# Patient Record
Sex: MALE | Race: WHITE | NOT HISPANIC OR LATINO | Employment: FULL TIME | URBAN - METROPOLITAN AREA
[De-identification: names, ages, dates, MRNs, and addresses within clinical notes are randomized per-mention and may not be internally consistent; named-entity substitution may affect disease eponyms.]

---

## 2020-11-03 ENCOUNTER — HOSPITAL ENCOUNTER (EMERGENCY)
Facility: HOSPITAL | Age: 25
Discharge: HOME | End: 2020-11-04
Attending: EMERGENCY MEDICINE | Admitting: EMERGENCY MEDICINE

## 2020-11-03 DIAGNOSIS — H10.32 ACUTE CONJUNCTIVITIS OF LEFT EYE, UNSPECIFIED ACUTE CONJUNCTIVITIS TYPE: ICD-10-CM

## 2020-11-03 DIAGNOSIS — F10.920 ALCOHOLIC INTOXICATION WITHOUT COMPLICATION (HCC): Primary | ICD-10-CM

## 2020-11-03 PROCEDURE — 99283 EMERGENCY DEPT VISIT LOW MDM: CPT

## 2020-11-04 VITALS
RESPIRATION RATE: 18 BRPM | OXYGEN SATURATION: 100 % | WEIGHT: 198.63 LBS | DIASTOLIC BLOOD PRESSURE: 83 MMHG | HEIGHT: 73 IN | SYSTOLIC BLOOD PRESSURE: 141 MMHG | HEART RATE: 111 BPM | TEMPERATURE: 98.1 F | BODY MASS INDEX: 26.33 KG/M2

## 2020-11-04 LAB
ALBUMIN SERPL-MCNC: 5.5 G/DL (ref 3.5–5.2)
ALBUMIN/GLOB SERPL: 2.3 G/DL
ALP SERPL-CCNC: 93 U/L (ref 39–117)
ALT SERPL W P-5'-P-CCNC: 34 U/L (ref 1–41)
ANION GAP SERPL CALCULATED.3IONS-SCNC: 15 MMOL/L (ref 5–15)
APTT PPP: 26.4 SECONDS (ref 24–31)
AST SERPL-CCNC: 51 U/L (ref 1–40)
BASOPHILS # BLD AUTO: 0.1 10*3/MM3 (ref 0–0.2)
BASOPHILS NFR BLD AUTO: 0.9 % (ref 0–1.5)
BILIRUB SERPL-MCNC: 0.5 MG/DL (ref 0–1.2)
BUN SERPL-MCNC: 6 MG/DL (ref 6–20)
BUN/CREAT SERPL: 7.3 (ref 7–25)
CALCIUM SPEC-SCNC: 10 MG/DL (ref 8.6–10.5)
CHLORIDE SERPL-SCNC: 98 MMOL/L (ref 98–107)
CO2 SERPL-SCNC: 29 MMOL/L (ref 22–29)
CREAT SERPL-MCNC: 0.82 MG/DL (ref 0.76–1.27)
DEPRECATED RDW RBC AUTO: 39.8 FL (ref 37–54)
EOSINOPHIL # BLD AUTO: 0 10*3/MM3 (ref 0–0.4)
EOSINOPHIL NFR BLD AUTO: 0.5 % (ref 0.3–6.2)
ERYTHROCYTE [DISTWIDTH] IN BLOOD BY AUTOMATED COUNT: 12.1 % (ref 12.3–15.4)
ETHANOL UR QL: 0.34 %
GFR SERPL CREATININE-BSD FRML MDRD: 113 ML/MIN/1.73
GFR SERPL CREATININE-BSD FRML MDRD: 137 ML/MIN/1.73
GLOBULIN UR ELPH-MCNC: 2.4 GM/DL
GLUCOSE SERPL-MCNC: 117 MG/DL (ref 65–99)
HCT VFR BLD AUTO: 48.3 % (ref 37.5–51)
HGB BLD-MCNC: 17.1 G/DL (ref 13–17.7)
HOLD SPECIMEN: NORMAL
HOLD SPECIMEN: NORMAL
INR PPP: <0.93 (ref 0.93–1.1)
LYMPHOCYTES # BLD AUTO: 3 10*3/MM3 (ref 0.7–3.1)
LYMPHOCYTES NFR BLD AUTO: 32.2 % (ref 19.6–45.3)
MCH RBC QN AUTO: 33 PG (ref 26.6–33)
MCHC RBC AUTO-ENTMCNC: 35.4 G/DL (ref 31.5–35.7)
MCV RBC AUTO: 93.4 FL (ref 79–97)
MONOCYTES # BLD AUTO: 1.1 10*3/MM3 (ref 0.1–0.9)
MONOCYTES NFR BLD AUTO: 11.9 % (ref 5–12)
NEUTROPHILS NFR BLD AUTO: 5.1 10*3/MM3 (ref 1.7–7)
NEUTROPHILS NFR BLD AUTO: 54.5 % (ref 42.7–76)
NRBC BLD AUTO-RTO: 0.1 /100 WBC (ref 0–0.2)
PLATELET # BLD AUTO: 224 10*3/MM3 (ref 140–450)
PMV BLD AUTO: 8.8 FL (ref 6–12)
POTASSIUM SERPL-SCNC: 3.9 MMOL/L (ref 3.5–5.2)
PROT SERPL-MCNC: 7.9 G/DL (ref 6–8.5)
PROTHROMBIN TIME: 9.8 SECONDS (ref 9.6–11.7)
RBC # BLD AUTO: 5.17 10*6/MM3 (ref 4.14–5.8)
SODIUM SERPL-SCNC: 142 MMOL/L (ref 136–145)
WBC # BLD AUTO: 9.4 10*3/MM3 (ref 3.4–10.8)
WHOLE BLOOD HOLD SPECIMEN: NORMAL
WHOLE BLOOD HOLD SPECIMEN: NORMAL

## 2020-11-04 PROCEDURE — 25010000002 THIAMINE PER 100 MG: Performed by: EMERGENCY MEDICINE

## 2020-11-04 PROCEDURE — 85025 COMPLETE CBC W/AUTO DIFF WBC: CPT | Performed by: EMERGENCY MEDICINE

## 2020-11-04 PROCEDURE — 80053 COMPREHEN METABOLIC PANEL: CPT | Performed by: EMERGENCY MEDICINE

## 2020-11-04 PROCEDURE — 96365 THER/PROPH/DIAG IV INF INIT: CPT

## 2020-11-04 PROCEDURE — 80307 DRUG TEST PRSMV CHEM ANLYZR: CPT | Performed by: EMERGENCY MEDICINE

## 2020-11-04 PROCEDURE — 85730 THROMBOPLASTIN TIME PARTIAL: CPT | Performed by: EMERGENCY MEDICINE

## 2020-11-04 PROCEDURE — 85610 PROTHROMBIN TIME: CPT | Performed by: EMERGENCY MEDICINE

## 2020-11-04 RX ORDER — SODIUM CHLORIDE 0.9 % (FLUSH) 0.9 %
10 SYRINGE (ML) INJECTION AS NEEDED
Status: DISCONTINUED | OUTPATIENT
Start: 2020-11-04 | End: 2020-11-04 | Stop reason: HOSPADM

## 2020-11-04 RX ORDER — SULFACETAMIDE SODIUM 100 MG/ML
1 SOLUTION/ DROPS OPHTHALMIC EVERY 4 HOURS
Qty: 10 ML | Refills: 0 | OUTPATIENT
Start: 2020-11-04 | End: 2021-06-27

## 2020-11-04 RX ADMIN — THIAMINE HYDROCHLORIDE 1000 ML/HR: 100 INJECTION, SOLUTION INTRAMUSCULAR; INTRAVENOUS at 00:39

## 2020-11-04 NOTE — ED PROVIDER NOTES
"Subjective   History of Present Illness  Left eye redness  25-year-old male states he has redness of left eye over last 2 days.  Is a he also had a nosebleed.  He denies any congestion or eye pain or blurry vision.  He states he did have a seizure 3 days ago and has not drinking alcohol in 3 days with a long history of alcohol abuse.  He states he has had alcohol withdrawal seizures in the past.  He reports no headache or focal numbness or weakness or trauma.  Review of Systems   Constitutional: Negative.    HENT: Positive for nosebleeds.    Eyes: Positive for redness. Negative for visual disturbance.   Respiratory: Negative.    Cardiovascular: Negative.    Gastrointestinal: Negative.    Genitourinary: Negative.    Musculoskeletal: Negative.    Skin: Negative.    Neurological: Negative.    Psychiatric/Behavioral: Negative.        No past medical history on file.    No Known Allergies    No past surgical history on file.    No family history on file.    Social History     Socioeconomic History   • Marital status: Single     Spouse name: Not on file   • Number of children: Not on file   • Years of education: Not on file   • Highest education level: Not on file       Prior to Admission medications    Not on File     /93   Pulse (!) 121   Temp 98.1 °F (36.7 °C) (Oral)   Resp 17   Ht 185.4 cm (73\")   Wt 90.1 kg (198 lb 10.2 oz)   SpO2 97%   BMI 26.21 kg/m²   I examined the patient using the appropriate personal protective equipment.        Objective   Physical Exam  General: Well-developed well-appearing, no acute distress, alert and appropriate  Eyes: Pupils round and reactive, sclera nonicteric, conjunctiva mildly injected left eye, right eye is clear, there is no hyphema or hypopyon, extraocular motion intact in all direction, no foreign bodies, cornea clear  HEENT: Mucous membranes moist, no mucosal swelling  Neck: Supple, no nuchal rigidity, no lymphadenopathy  Respirations: Respirations nonlabored, " equal breath sounds bilaterally, clear lungs  Heart regular rate and rhythm, no murmurs rubs or gallops,   Abdomen soft nontender nondistended, no hepatosplenomegaly, no hernia, no mass, normal bowel sounds, no CVA tenderness  Extremities no clubbing cyanosis or edema, calves are symmetric and nontender  Neuro cranial nerves grossly intact, no focal limb deficits  Psych oriented, pleasant affect  Skin no rash, brisk cap refill  Procedures           ED Course      Results for orders placed or performed during the hospital encounter of 11/03/20   Comprehensive Metabolic Panel    Specimen: Blood   Result Value Ref Range    Glucose 117 (H) 65 - 99 mg/dL    BUN 6 6 - 20 mg/dL    Creatinine 0.82 0.76 - 1.27 mg/dL    Sodium 142 136 - 145 mmol/L    Potassium 3.9 3.5 - 5.2 mmol/L    Chloride 98 98 - 107 mmol/L    CO2 29.0 22.0 - 29.0 mmol/L    Calcium 10.0 8.6 - 10.5 mg/dL    Total Protein 7.9 6.0 - 8.5 g/dL    Albumin 5.50 (H) 3.50 - 5.20 g/dL    ALT (SGPT) 34 1 - 41 U/L    AST (SGOT) 51 (H) 1 - 40 U/L    Alkaline Phosphatase 93 39 - 117 U/L    Total Bilirubin 0.5 0.0 - 1.2 mg/dL    eGFR Non African Amer 113 >60 mL/min/1.73    eGFR  African Amer 137 >60 mL/min/1.73    Globulin 2.4 gm/dL    A/G Ratio 2.3 g/dL    BUN/Creatinine Ratio 7.3 7.0 - 25.0    Anion Gap 15.0 5.0 - 15.0 mmol/L   Protime-INR    Specimen: Blood   Result Value Ref Range    Protime 9.8 9.6 - 11.7 Seconds    INR <0.93 (L) 0.93 - 1.10   aPTT    Specimen: Blood   Result Value Ref Range    PTT 26.4 24.0 - 31.0 seconds   Ethanol    Specimen: Blood   Result Value Ref Range    Ethanol % 0.344 %   CBC Auto Differential    Specimen: Blood   Result Value Ref Range    WBC 9.40 3.40 - 10.80 10*3/mm3    RBC 5.17 4.14 - 5.80 10*6/mm3    Hemoglobin 17.1 13.0 - 17.7 g/dL    Hematocrit 48.3 37.5 - 51.0 %    MCV 93.4 79.0 - 97.0 fL    MCH 33.0 26.6 - 33.0 pg    MCHC 35.4 31.5 - 35.7 g/dL    RDW 12.1 (L) 12.3 - 15.4 %    RDW-SD 39.8 37.0 - 54.0 fl    MPV 8.8 6.0 - 12.0 fL     Platelets 224 140 - 450 10*3/mm3    Neutrophil % 54.5 42.7 - 76.0 %    Lymphocyte % 32.2 19.6 - 45.3 %    Monocyte % 11.9 5.0 - 12.0 %    Eosinophil % 0.5 0.3 - 6.2 %    Basophil % 0.9 0.0 - 1.5 %    Neutrophils, Absolute 5.10 1.70 - 7.00 10*3/mm3    Lymphocytes, Absolute 3.00 0.70 - 3.10 10*3/mm3    Monocytes, Absolute 1.10 (H) 0.10 - 0.90 10*3/mm3    Eosinophils, Absolute 0.00 0.00 - 0.40 10*3/mm3    Basophils, Absolute 0.10 0.00 - 0.20 10*3/mm3    nRBC 0.1 0.0 - 0.2 /100 WBC   Light Blue Top   Result Value Ref Range    Extra Tube hold for add-on    Green Top (Gel)   Result Value Ref Range    Extra Tube Hold for add-ons.    Lavender Top   Result Value Ref Range    Extra Tube hold for add-on    Gold Top - SST   Result Value Ref Range    Extra Tube Hold for add-ons.                                           MDM  Patient was found to have acute alcohol intoxication despite his claim that he had not drank in the last 3 days.  He had no seizure activity today.  The patient does have signs of left conjunctivitis.  He was advised to keep his contacts out of his eyes for the next several days until completely clear.  He was prescribed sulfacetamide drops.  He is calling for a ride and will be discharged.  He was advised on some alcohol abuse counseling options.  Final diagnoses:   Alcoholic intoxication without complication (CMS/[facility])   Acute conjunctivitis of left eye, unspecified acute conjunctivitis type            Anthony Henning MD  11/04/20 0144

## 2020-11-04 NOTE — DISCHARGE INSTRUCTIONS
Follow-up with [facility] or [facility] for substance abuse counseling.  Keep your contacts out of your eye for the next 1 week and until the redness has cleared.  Return for increased redness, blurry vision, fever or any other concerns

## 2021-06-27 ENCOUNTER — HOSPITAL ENCOUNTER (EMERGENCY)
Facility: HOSPITAL | Age: 26
Discharge: HOME | End: 2021-06-27
Attending: EMERGENCY MEDICINE | Admitting: EMERGENCY MEDICINE

## 2021-06-27 ENCOUNTER — APPOINTMENT (OUTPATIENT)
Dept: CT IMAGING | Facility: HOSPITAL | Age: 26
End: 2021-06-27

## 2021-06-27 VITALS
OXYGEN SATURATION: 100 % | WEIGHT: 176.59 LBS | DIASTOLIC BLOOD PRESSURE: 84 MMHG | BODY MASS INDEX: 23.4 KG/M2 | RESPIRATION RATE: 16 BRPM | TEMPERATURE: 98.1 F | HEART RATE: 98 BPM | HEIGHT: 73 IN | SYSTOLIC BLOOD PRESSURE: 139 MMHG

## 2021-06-27 DIAGNOSIS — R20.2 PARESTHESIAS: ICD-10-CM

## 2021-06-27 DIAGNOSIS — R10.84 GENERALIZED ABDOMINAL PAIN: ICD-10-CM

## 2021-06-27 DIAGNOSIS — R11.2 NAUSEA AND VOMITING, INTRACTABILITY OF VOMITING NOT SPECIFIED, UNSPECIFIED VOMITING TYPE: Primary | ICD-10-CM

## 2021-06-27 LAB
ALBUMIN SERPL-MCNC: 5.3 G/DL (ref 3.5–5.2)
ALBUMIN/GLOB SERPL: 1.8 G/DL
ALP SERPL-CCNC: 83 U/L (ref 39–117)
ALT SERPL W P-5'-P-CCNC: 138 U/L (ref 1–41)
AMPHET+METHAMPHET UR QL: NEGATIVE
ANION GAP SERPL CALCULATED.3IONS-SCNC: 17 MMOL/L (ref 5–15)
APTT PPP: 26.9 SECONDS (ref 24–31)
AST SERPL-CCNC: 157 U/L (ref 1–40)
BACTERIA UR QL AUTO: ABNORMAL /HPF
BARBITURATES UR QL SCN: NEGATIVE
BASOPHILS # BLD AUTO: 0.1 10*3/MM3 (ref 0–0.2)
BASOPHILS NFR BLD AUTO: 1.1 % (ref 0–1.5)
BENZODIAZ UR QL SCN: NEGATIVE
BILIRUB SERPL-MCNC: 0.8 MG/DL (ref 0–1.2)
BILIRUB UR QL STRIP: ABNORMAL
BUN SERPL-MCNC: 6 MG/DL (ref 6–20)
BUN/CREAT SERPL: 8 (ref 7–25)
CALCIUM SPEC-SCNC: 9.2 MG/DL (ref 8.6–10.5)
CANNABINOIDS SERPL QL: POSITIVE
CHLORIDE SERPL-SCNC: 96 MMOL/L (ref 98–107)
CK SERPL-CCNC: 185 U/L (ref 20–200)
CLARITY UR: CLEAR
CO2 SERPL-SCNC: 27 MMOL/L (ref 22–29)
COCAINE UR QL: NEGATIVE
COLOR UR: ABNORMAL
CREAT SERPL-MCNC: 0.75 MG/DL (ref 0.76–1.27)
DEPRECATED RDW RBC AUTO: 42.9 FL (ref 37–54)
EOSINOPHIL # BLD AUTO: 0.1 10*3/MM3 (ref 0–0.4)
EOSINOPHIL NFR BLD AUTO: 1.2 % (ref 0.3–6.2)
ERYTHROCYTE [DISTWIDTH] IN BLOOD BY AUTOMATED COUNT: 12.5 % (ref 12.3–15.4)
ERYTHROCYTE [SEDIMENTATION RATE] IN BLOOD: 10 MM/HR (ref 0–15)
ETHANOL UR QL: 0.25 %
GFR SERPL CREATININE-BSD FRML MDRD: 125 ML/MIN/1.73
GLOBULIN UR ELPH-MCNC: 3 GM/DL
GLUCOSE SERPL-MCNC: 88 MG/DL (ref 65–99)
GLUCOSE UR STRIP-MCNC: NEGATIVE MG/DL
HCT VFR BLD AUTO: 47.5 % (ref 37.5–51)
HGB BLD-MCNC: 16.6 G/DL (ref 13–17.7)
HGB UR QL STRIP.AUTO: NEGATIVE
HOLD SPECIMEN: NORMAL
HYALINE CASTS UR QL AUTO: ABNORMAL /LPF
INR PPP: <0.93 (ref 0.93–1.1)
KETONES UR QL STRIP: ABNORMAL
LEUKOCYTE ESTERASE UR QL STRIP.AUTO: ABNORMAL
LIPASE SERPL-CCNC: 36 U/L (ref 13–60)
LYMPHOCYTES # BLD AUTO: 1.7 10*3/MM3 (ref 0.7–3.1)
LYMPHOCYTES NFR BLD AUTO: 26.4 % (ref 19.6–45.3)
MAGNESIUM SERPL-MCNC: 1.9 MG/DL (ref 1.6–2.6)
MCH RBC QN AUTO: 34 PG (ref 26.6–33)
MCHC RBC AUTO-ENTMCNC: 35 G/DL (ref 31.5–35.7)
MCV RBC AUTO: 97.4 FL (ref 79–97)
METHADONE UR QL SCN: NEGATIVE
MONOCYTES # BLD AUTO: 0.7 10*3/MM3 (ref 0.1–0.9)
MONOCYTES NFR BLD AUTO: 10.4 % (ref 5–12)
NEUTROPHILS NFR BLD AUTO: 4 10*3/MM3 (ref 1.7–7)
NEUTROPHILS NFR BLD AUTO: 60.9 % (ref 42.7–76)
NITRITE UR QL STRIP: NEGATIVE
NRBC BLD AUTO-RTO: 0.1 /100 WBC (ref 0–0.2)
OPIATES UR QL: NEGATIVE
OXYCODONE UR QL SCN: NEGATIVE
PH UR STRIP.AUTO: 6 [PH] (ref 5–8)
PLATELET # BLD AUTO: 154 10*3/MM3 (ref 140–450)
PMV BLD AUTO: 9 FL (ref 6–12)
POTASSIUM SERPL-SCNC: 3.9 MMOL/L (ref 3.5–5.2)
PROT SERPL-MCNC: 8.3 G/DL (ref 6–8.5)
PROT UR QL STRIP: ABNORMAL
PROTHROMBIN TIME: 10.1 SECONDS (ref 9.6–11.7)
RBC # BLD AUTO: 4.88 10*6/MM3 (ref 4.14–5.8)
RBC # UR: ABNORMAL /HPF
REF LAB TEST METHOD: ABNORMAL
SODIUM SERPL-SCNC: 140 MMOL/L (ref 136–145)
SP GR UR STRIP: 1.02 (ref 1–1.03)
SQUAMOUS #/AREA URNS HPF: ABNORMAL /HPF
TSH SERPL DL<=0.05 MIU/L-ACNC: 0.95 UIU/ML (ref 0.27–4.2)
UROBILINOGEN UR QL STRIP: ABNORMAL
WBC # BLD AUTO: 6.5 10*3/MM3 (ref 3.4–10.8)
WBC UR QL AUTO: ABNORMAL /HPF

## 2021-06-27 PROCEDURE — 80307 DRUG TEST PRSMV CHEM ANLYZR: CPT | Performed by: EMERGENCY MEDICINE

## 2021-06-27 PROCEDURE — 85025 COMPLETE CBC W/AUTO DIFF WBC: CPT | Performed by: EMERGENCY MEDICINE

## 2021-06-27 PROCEDURE — 83735 ASSAY OF MAGNESIUM: CPT | Performed by: EMERGENCY MEDICINE

## 2021-06-27 PROCEDURE — 0 IOPAMIDOL PER 1 ML: Performed by: EMERGENCY MEDICINE

## 2021-06-27 PROCEDURE — 83690 ASSAY OF LIPASE: CPT | Performed by: EMERGENCY MEDICINE

## 2021-06-27 PROCEDURE — 85610 PROTHROMBIN TIME: CPT | Performed by: EMERGENCY MEDICINE

## 2021-06-27 PROCEDURE — 82077 ASSAY SPEC XCP UR&BREATH IA: CPT | Performed by: EMERGENCY MEDICINE

## 2021-06-27 PROCEDURE — 81001 URINALYSIS AUTO W/SCOPE: CPT | Performed by: EMERGENCY MEDICINE

## 2021-06-27 PROCEDURE — 25010000002 THIAMINE PER 100 MG: Performed by: EMERGENCY MEDICINE

## 2021-06-27 PROCEDURE — 85652 RBC SED RATE AUTOMATED: CPT | Performed by: EMERGENCY MEDICINE

## 2021-06-27 PROCEDURE — 84443 ASSAY THYROID STIM HORMONE: CPT | Performed by: EMERGENCY MEDICINE

## 2021-06-27 PROCEDURE — 80053 COMPREHEN METABOLIC PANEL: CPT | Performed by: EMERGENCY MEDICINE

## 2021-06-27 PROCEDURE — 74177 CT ABD & PELVIS W/CONTRAST: CPT

## 2021-06-27 PROCEDURE — 25010000002 ONDANSETRON PER 1 MG: Performed by: EMERGENCY MEDICINE

## 2021-06-27 PROCEDURE — 82550 ASSAY OF CK (CPK): CPT | Performed by: EMERGENCY MEDICINE

## 2021-06-27 PROCEDURE — 96375 TX/PRO/DX INJ NEW DRUG ADDON: CPT

## 2021-06-27 PROCEDURE — 99283 EMERGENCY DEPT VISIT LOW MDM: CPT

## 2021-06-27 PROCEDURE — 85730 THROMBOPLASTIN TIME PARTIAL: CPT | Performed by: EMERGENCY MEDICINE

## 2021-06-27 PROCEDURE — 96365 THER/PROPH/DIAG IV INF INIT: CPT

## 2021-06-27 RX ORDER — ONDANSETRON 4 MG/1
4 TABLET, ORALLY DISINTEGRATING ORAL EVERY 6 HOURS PRN
Qty: 12 TABLET | Refills: 0 | Status: SHIPPED | OUTPATIENT
Start: 2021-06-27 | End: 2022-11-09

## 2021-06-27 RX ORDER — ONDANSETRON 2 MG/ML
8 INJECTION INTRAMUSCULAR; INTRAVENOUS ONCE
Status: COMPLETED | OUTPATIENT
Start: 2021-06-27 | End: 2021-06-27

## 2021-06-27 RX ORDER — SODIUM CHLORIDE 0.9 % (FLUSH) 0.9 %
10 SYRINGE (ML) INJECTION AS NEEDED
Status: DISCONTINUED | OUTPATIENT
Start: 2021-06-27 | End: 2021-06-27 | Stop reason: HOSPADM

## 2021-06-27 RX ADMIN — THIAMINE HYDROCHLORIDE 1000 ML/HR: 100 INJECTION, SOLUTION INTRAMUSCULAR; INTRAVENOUS at 18:06

## 2021-06-27 RX ADMIN — Medication 10 ML: at 18:06

## 2021-06-27 RX ADMIN — IOPAMIDOL 100 ML: 755 INJECTION, SOLUTION INTRAVENOUS at 19:40

## 2021-06-27 RX ADMIN — ONDANSETRON 8 MG: 2 INJECTION INTRAMUSCULAR; INTRAVENOUS at 18:06

## 2022-06-08 ENCOUNTER — APPOINTMENT (OUTPATIENT)
Dept: GENERAL RADIOLOGY | Facility: HOSPITAL | Age: 27
End: 2022-06-08

## 2022-06-08 ENCOUNTER — HOSPITAL ENCOUNTER (EMERGENCY)
Facility: HOSPITAL | Age: 27
Discharge: HOME | End: 2022-06-09
Attending: EMERGENCY MEDICINE | Admitting: EMERGENCY MEDICINE

## 2022-06-08 DIAGNOSIS — U07.1 COVID-19: Primary | ICD-10-CM

## 2022-06-08 DIAGNOSIS — R53.1 WEAKNESS: ICD-10-CM

## 2022-06-08 LAB
ALBUMIN SERPL-MCNC: 4.5 G/DL (ref 3.5–5.2)
ALBUMIN/GLOB SERPL: 1.6 G/DL
ALP SERPL-CCNC: 82 U/L (ref 39–117)
ALT SERPL W P-5'-P-CCNC: 69 U/L (ref 1–41)
ANION GAP SERPL CALCULATED.3IONS-SCNC: 17 MMOL/L (ref 5–15)
APTT PPP: 27 SECONDS (ref 61–76.5)
AST SERPL-CCNC: 77 U/L (ref 1–40)
BASOPHILS # BLD AUTO: 0 10*3/MM3 (ref 0–0.2)
BASOPHILS NFR BLD AUTO: 0.6 % (ref 0–1.5)
BILIRUB SERPL-MCNC: 0.3 MG/DL (ref 0–1.2)
BUN SERPL-MCNC: 5 MG/DL (ref 6–20)
BUN/CREAT SERPL: 7.5 (ref 7–25)
CALCIUM SPEC-SCNC: 8.9 MG/DL (ref 8.6–10.5)
CHLORIDE SERPL-SCNC: 101 MMOL/L (ref 98–107)
CK SERPL-CCNC: 114 U/L (ref 20–200)
CO2 SERPL-SCNC: 26 MMOL/L (ref 22–29)
CREAT SERPL-MCNC: 0.67 MG/DL (ref 0.76–1.27)
D-LACTATE SERPL-SCNC: 2.1 MMOL/L (ref 0.5–2)
DEPRECATED RDW RBC AUTO: 42 FL (ref 37–54)
EGFRCR SERPLBLD CKD-EPI 2021: 130.4 ML/MIN/1.73
EOSINOPHIL # BLD AUTO: 0 10*3/MM3 (ref 0–0.4)
EOSINOPHIL NFR BLD AUTO: 0.8 % (ref 0.3–6.2)
ERYTHROCYTE [DISTWIDTH] IN BLOOD BY AUTOMATED COUNT: 12.5 % (ref 12.3–15.4)
ETHANOL UR QL: 0.32 %
GLOBULIN UR ELPH-MCNC: 2.8 GM/DL
GLUCOSE SERPL-MCNC: 83 MG/DL (ref 65–99)
HCT VFR BLD AUTO: 47.3 % (ref 37.5–51)
HGB BLD-MCNC: 16 G/DL (ref 13–17.7)
INR PPP: 0.94 (ref 0.93–1.1)
LIPASE SERPL-CCNC: 19 U/L (ref 13–60)
LYMPHOCYTES # BLD AUTO: 1.8 10*3/MM3 (ref 0.7–3.1)
LYMPHOCYTES NFR BLD AUTO: 36.4 % (ref 19.6–45.3)
MAGNESIUM SERPL-MCNC: 1.6 MG/DL (ref 1.6–2.6)
MCH RBC QN AUTO: 32.8 PG (ref 26.6–33)
MCHC RBC AUTO-ENTMCNC: 33.9 G/DL (ref 31.5–35.7)
MCV RBC AUTO: 96.8 FL (ref 79–97)
MONOCYTES # BLD AUTO: 0.3 10*3/MM3 (ref 0.1–0.9)
MONOCYTES NFR BLD AUTO: 6 % (ref 5–12)
NEUTROPHILS NFR BLD AUTO: 2.8 10*3/MM3 (ref 1.7–7)
NEUTROPHILS NFR BLD AUTO: 56.2 % (ref 42.7–76)
NRBC BLD AUTO-RTO: 0.1 /100 WBC (ref 0–0.2)
PLATELET # BLD AUTO: 226 10*3/MM3 (ref 140–450)
PMV BLD AUTO: 7.8 FL (ref 6–12)
POTASSIUM SERPL-SCNC: 3.9 MMOL/L (ref 3.5–5.2)
PROT SERPL-MCNC: 7.3 G/DL (ref 6–8.5)
PROTHROMBIN TIME: 9.7 SECONDS (ref 9.6–11.7)
RBC # BLD AUTO: 4.88 10*6/MM3 (ref 4.14–5.8)
SODIUM SERPL-SCNC: 144 MMOL/L (ref 136–145)
TSH SERPL DL<=0.05 MIU/L-ACNC: 1.79 UIU/ML (ref 0.27–4.2)
WBC NRBC COR # BLD: 4.9 10*3/MM3 (ref 3.4–10.8)

## 2022-06-08 PROCEDURE — 82550 ASSAY OF CK (CPK): CPT | Performed by: EMERGENCY MEDICINE

## 2022-06-08 PROCEDURE — 80053 COMPREHEN METABOLIC PANEL: CPT | Performed by: EMERGENCY MEDICINE

## 2022-06-08 PROCEDURE — 85610 PROTHROMBIN TIME: CPT | Performed by: EMERGENCY MEDICINE

## 2022-06-08 PROCEDURE — 83735 ASSAY OF MAGNESIUM: CPT | Performed by: EMERGENCY MEDICINE

## 2022-06-08 PROCEDURE — 84443 ASSAY THYROID STIM HORMONE: CPT | Performed by: EMERGENCY MEDICINE

## 2022-06-08 PROCEDURE — 36415 COLL VENOUS BLD VENIPUNCTURE: CPT

## 2022-06-08 PROCEDURE — 99283 EMERGENCY DEPT VISIT LOW MDM: CPT

## 2022-06-08 PROCEDURE — 71045 X-RAY EXAM CHEST 1 VIEW: CPT

## 2022-06-08 PROCEDURE — 25010000002 THIAMINE PER 100 MG: Performed by: EMERGENCY MEDICINE

## 2022-06-08 PROCEDURE — 96365 THER/PROPH/DIAG IV INF INIT: CPT

## 2022-06-08 PROCEDURE — 82077 ASSAY SPEC XCP UR&BREATH IA: CPT | Performed by: EMERGENCY MEDICINE

## 2022-06-08 PROCEDURE — 85730 THROMBOPLASTIN TIME PARTIAL: CPT | Performed by: EMERGENCY MEDICINE

## 2022-06-08 PROCEDURE — 83605 ASSAY OF LACTIC ACID: CPT

## 2022-06-08 PROCEDURE — 85025 COMPLETE CBC W/AUTO DIFF WBC: CPT | Performed by: EMERGENCY MEDICINE

## 2022-06-08 PROCEDURE — 83690 ASSAY OF LIPASE: CPT | Performed by: EMERGENCY MEDICINE

## 2022-06-08 RX ORDER — SODIUM CHLORIDE 0.9 % (FLUSH) 0.9 %
10 SYRINGE (ML) INJECTION AS NEEDED
Status: DISCONTINUED | OUTPATIENT
Start: 2022-06-08 | End: 2022-06-09 | Stop reason: HOSPADM

## 2022-06-08 RX ADMIN — THIAMINE HYDROCHLORIDE 1000 ML/HR: 100 INJECTION, SOLUTION INTRAMUSCULAR; INTRAVENOUS at 23:24

## 2022-06-09 VITALS
BODY MASS INDEX: 24.38 KG/M2 | TEMPERATURE: 98.2 F | HEIGHT: 72 IN | RESPIRATION RATE: 17 BRPM | OXYGEN SATURATION: 100 % | WEIGHT: 180 LBS | DIASTOLIC BLOOD PRESSURE: 76 MMHG | HEART RATE: 97 BPM | SYSTOLIC BLOOD PRESSURE: 118 MMHG

## 2022-06-09 PROCEDURE — 87040 BLOOD CULTURE FOR BACTERIA: CPT | Performed by: EMERGENCY MEDICINE

## 2022-06-09 NOTE — DISCHARGE INSTRUCTIONS
Rest plenty fluids Tylenol for fever  Quarantine 5 days  Return for increasing shortness of breath decreased oxygen saturations vomiting cannot hold anything down rash or any other new or worse problems or concerns return immediately to the ER.  Consider follow-up with the listed above facilities for alcohol treatment.

## 2022-06-09 NOTE — ED PROVIDER NOTES
Subjective   Chief complaint positive for COVID body aches fever chills weakness    History of present illness 26-year-old male states has history of alcoholism history of withdrawal seizures and anxiety related seizures but does not take any medications he states that he has been sick for about 2 to 3 days with cough congestion body aches headache fever chills.  Symptoms are severe ongoing continuous 2 to 3 days worse with activity and better with rest.  He has been at home.  Patient states he did test positive for COVID-19.  No injury.  Mild shortness of breath noted.  No one in the home with similar illness.  Patient states that he has been drinking today.  No recent long car ride plane or immobilization or foreign travels.  No urinary problems or bowel problems no black or bloody stool.  Patient went to urgent care they report that they felt like they may have heard some pneumonia in the right lung and they sent him to the ER          Review of Systems   Constitutional: Positive for chills and fever.   HENT: Positive for congestion. Negative for sinus pressure.    Eyes: Negative for photophobia and visual disturbance.   Respiratory: Positive for cough and shortness of breath.    Gastrointestinal: Negative for abdominal pain.   Genitourinary: Negative for difficulty urinating and dysuria.   Musculoskeletal: Positive for myalgias. Negative for back pain.   Skin: Negative for color change and rash.   Neurological: Positive for weakness. Negative for dizziness and light-headedness.   Psychiatric/Behavioral: Negative for agitation and confusion.       Past Medical History:   Diagnosis Date   • Seizures (CMS/HCC)     as a child and withdrawal related     Alcoholism  No Known Allergies    Past Surgical History:   Procedure Laterality Date   • MYRINGOTOMY W/ TUBES     • PILONIDAL CYSTECTOMY         No family history on file.    Social History     Socioeconomic History   • Marital status: Single   Tobacco Use   • Smoking  status: Current Every Day Smoker     Packs/day: 0.50     Types: Cigarettes   • Smokeless tobacco: Never Used   Substance and Sexual Activity   • Alcohol use: Yes     Alcohol/week: 16.0 standard drinks     Types: 16 Shots of liquor per week   • Drug use: Yes     Types: Marijuana     Patient takes no medications.  He used to be on clonazepam      Objective   Physical Exam  Constitutional is a 26-year-old male awake alert no distress temperature 98.2 heart rate is noted to be 97 blood pressure 142/97 he is in no distress respiratory of 18.  Sats are 100% on room air  Procedures           ED Course      Results for orders placed or performed during the hospital encounter of 06/08/22   Comprehensive Metabolic Panel    Specimen: Blood   Result Value Ref Range    Glucose 83 65 - 99 mg/dL    BUN 5 (L) 6 - 20 mg/dL    Creatinine 0.67 (L) 0.76 - 1.27 mg/dL    Sodium 144 136 - 145 mmol/L    Potassium 3.9 3.5 - 5.2 mmol/L    Chloride 101 98 - 107 mmol/L    CO2 26.0 22.0 - 29.0 mmol/L    Calcium 8.9 8.6 - 10.5 mg/dL    Total Protein 7.3 6.0 - 8.5 g/dL    Albumin 4.50 3.50 - 5.20 g/dL    ALT (SGPT) 69 (H) 1 - 41 U/L    AST (SGOT) 77 (H) 1 - 40 U/L    Alkaline Phosphatase 82 39 - 117 U/L    Total Bilirubin 0.3 0.0 - 1.2 mg/dL    Globulin 2.8 gm/dL    A/G Ratio 1.6 g/dL    BUN/Creatinine Ratio 7.5 7.0 - 25.0    Anion Gap 17.0 (H) 5.0 - 15.0 mmol/L    eGFR 130.4 >60.0 mL/min/1.73   Lipase    Specimen: Blood   Result Value Ref Range    Lipase 19 13 - 60 U/L   Protime-INR    Specimen: Blood   Result Value Ref Range    Protime 9.7 9.6 - 11.7 Seconds    INR 0.94 0.93 - 1.10   aPTT    Specimen: Blood   Result Value Ref Range    PTT 27.0 (L) 61.0 - 76.5 seconds   Magnesium    Specimen: Blood   Result Value Ref Range    Magnesium 1.6 1.6 - 2.6 mg/dL   CK    Specimen: Blood   Result Value Ref Range    Creatine Kinase 114 20 - 200 U/L   TSH    Specimen: Blood   Result Value Ref Range    TSH 1.790 0.270 - 4.200 uIU/mL   Ethanol    Specimen:  Blood   Result Value Ref Range    Ethanol % 0.320 %   CBC Auto Differential    Specimen: Blood   Result Value Ref Range    WBC 4.90 3.40 - 10.80 10*3/mm3    RBC 4.88 4.14 - 5.80 10*6/mm3    Hemoglobin 16.0 13.0 - 17.7 g/dL    Hematocrit 47.3 37.5 - 51.0 %    MCV 96.8 79.0 - 97.0 fL    MCH 32.8 26.6 - 33.0 pg    MCHC 33.9 31.5 - 35.7 g/dL    RDW 12.5 12.3 - 15.4 %    RDW-SD 42.0 37.0 - 54.0 fl    MPV 7.8 6.0 - 12.0 fL    Platelets 226 140 - 450 10*3/mm3    Neutrophil % 56.2 42.7 - 76.0 %    Lymphocyte % 36.4 19.6 - 45.3 %    Monocyte % 6.0 5.0 - 12.0 %    Eosinophil % 0.8 0.3 - 6.2 %    Basophil % 0.6 0.0 - 1.5 %    Neutrophils, Absolute 2.80 1.70 - 7.00 10*3/mm3    Lymphocytes, Absolute 1.80 0.70 - 3.10 10*3/mm3    Monocytes, Absolute 0.30 0.10 - 0.90 10*3/mm3    Eosinophils, Absolute 0.00 0.00 - 0.40 10*3/mm3    Basophils, Absolute 0.00 0.00 - 0.20 10*3/mm3    nRBC 0.1 0.0 - 0.2 /100 WBC   POC Lactate    Specimen: Blood   Result Value Ref Range    Lactate 2.1 (C) 0.5 - 2.0 mmol/L     XR Chest 1 View    Result Date: 6/8/2022  No active disease.  Electronically Signed By-Jatin Stuart MD On:6/8/2022 11:09 PM This report was finalized on [id number] by  Jatin Stuart MD.    Medications   sodium chloride 0.9 % flush 10 mL (has no administration in time range)   thiamine (B-1) 100 mg, folic acid 1 mg in sodium chloride 0.9 % 1,000 mL infusion (1,000 mL/hr Intravenous New Bag 6/8/22 7051)                                                  MDM  Number of Diagnoses or Management Options  COVID-19: new and requires workup  Weakness: new and requires workup  Diagnosis management comments: Medical decision making.  Patient IV established given 1 L banana bag and had the above exam evaluation x-ray obtained reviewed by me show no active disease also reviewed by radiology.  Labs obtained reviewed by me chemistries unremarkable ALT of 69 AST of 77 lipase normal.  The patient had a magnesium 1.6 CK1 14 and CH is 1.7 alcohol was  0.320 lactate was 2.1 CBC unremarkable.  Patient repeat exam was resting comfortably all labs obtained reviewed by me.  He was feeling much better at this point.  He desires to go home.  We talked about alcohol abuse and treatment programs.  He voices understanding.  I see no evidence to suggest sepsis I see no evidence to suggest acute intra-abdominal process no evidence of acute stroke meningitis encephalitis his abdomen soft without tenderness on repeat exam his sats are 100% on room air no evidence of pneumonia.  His lungs are clear no respiratory distress with respiratory rate of 18.  Heart was regular with a rate in the 90s.  No rashes no petechiae no purpura.  The patient made aware of the findings and he will be discharged home for outpatient management and follow-up.       Amount and/or Complexity of Data Reviewed  Clinical lab tests: reviewed  Tests in the radiology section of CPT®: reviewed    Risk of Complications, Morbidity, and/or Mortality  Presenting problems: high  Diagnostic procedures: high  Management options: high    Patient Progress  Patient progress: stable      Final diagnoses:   COVID-19   Weakness       ED Disposition  ED Disposition     ED Disposition   Discharge    Condition   Stable    Comment   --             PATIENT CONNECTION - [facility] [id number] [phone]  In 1 day      [facility]  [address] [facility] [address] [phone]  In 1 day      [address] [address] [phone]  In 1 day           Medication List      No changes were made to your prescriptions during this visit.          Andre Simeon MD  06/09/22 0240

## 2022-06-14 LAB
BACTERIA SPEC AEROBE CULT: NORMAL
BACTERIA SPEC AEROBE CULT: NORMAL

## 2022-11-08 ENCOUNTER — APPOINTMENT (OUTPATIENT)
Dept: CT IMAGING | Facility: HOSPITAL | Age: 27
End: 2022-11-08

## 2022-11-08 ENCOUNTER — HOSPITAL ENCOUNTER (EMERGENCY)
Facility: HOSPITAL | Age: 27
Discharge: HOME | End: 2022-11-09
Attending: EMERGENCY MEDICINE | Admitting: EMERGENCY MEDICINE

## 2022-11-08 DIAGNOSIS — R10.12 LEFT UPPER QUADRANT ABDOMINAL PAIN: ICD-10-CM

## 2022-11-08 DIAGNOSIS — K52.9 GASTROENTERITIS: ICD-10-CM

## 2022-11-08 DIAGNOSIS — R11.2 NAUSEA AND VOMITING, UNSPECIFIED VOMITING TYPE: Primary | ICD-10-CM

## 2022-11-08 DIAGNOSIS — E86.0 DEHYDRATION: ICD-10-CM

## 2022-11-08 LAB
ALBUMIN SERPL-MCNC: 5.8 G/DL (ref 3.5–5.2)
ALBUMIN/GLOB SERPL: 1.7 G/DL
ALP SERPL-CCNC: 131 U/L (ref 39–117)
ALT SERPL W P-5'-P-CCNC: 45 U/L (ref 1–41)
ANION GAP SERPL CALCULATED.3IONS-SCNC: 30 MMOL/L (ref 5–15)
AST SERPL-CCNC: 65 U/L (ref 1–40)
B PARAPERT DNA SPEC QL NAA+PROBE: NOT DETECTED
B PERT DNA SPEC QL NAA+PROBE: NOT DETECTED
BASOPHILS # BLD AUTO: 0 10*3/MM3 (ref 0–0.2)
BASOPHILS NFR BLD AUTO: 0.3 % (ref 0–1.5)
BILIRUB SERPL-MCNC: 1.1 MG/DL (ref 0–1.2)
BUN SERPL-MCNC: 15 MG/DL (ref 6–20)
BUN/CREAT SERPL: 12.1 (ref 7–25)
C PNEUM DNA NPH QL NAA+NON-PROBE: NOT DETECTED
CALCIUM SPEC-SCNC: 10.9 MG/DL (ref 8.6–10.5)
CHLORIDE SERPL-SCNC: 84 MMOL/L (ref 98–107)
CO2 SERPL-SCNC: 24 MMOL/L (ref 22–29)
CREAT SERPL-MCNC: 1.24 MG/DL (ref 0.76–1.27)
DEPRECATED RDW RBC AUTO: 52.9 FL (ref 37–54)
EGFRCR SERPLBLD CKD-EPI 2021: 80.7 ML/MIN/1.73
EOSINOPHIL # BLD AUTO: 0 10*3/MM3 (ref 0–0.4)
EOSINOPHIL NFR BLD AUTO: 0 % (ref 0.3–6.2)
ERYTHROCYTE [DISTWIDTH] IN BLOOD BY AUTOMATED COUNT: 14.7 % (ref 12.3–15.4)
ETHANOL UR QL: <0.01 %
FLUAV SUBTYP SPEC NAA+PROBE: NOT DETECTED
FLUBV RNA ISLT QL NAA+PROBE: NOT DETECTED
GLOBULIN UR ELPH-MCNC: 3.4 GM/DL
GLUCOSE SERPL-MCNC: 110 MG/DL (ref 65–99)
HADV DNA SPEC NAA+PROBE: NOT DETECTED
HCOV 229E RNA SPEC QL NAA+PROBE: NOT DETECTED
HCOV HKU1 RNA SPEC QL NAA+PROBE: NOT DETECTED
HCOV NL63 RNA SPEC QL NAA+PROBE: NOT DETECTED
HCOV OC43 RNA SPEC QL NAA+PROBE: NOT DETECTED
HCT VFR BLD AUTO: 49.8 % (ref 37.5–51)
HGB BLD-MCNC: 17.4 G/DL (ref 13–17.7)
HMPV RNA NPH QL NAA+NON-PROBE: NOT DETECTED
HPIV1 RNA ISLT QL NAA+PROBE: NOT DETECTED
HPIV2 RNA SPEC QL NAA+PROBE: NOT DETECTED
HPIV3 RNA NPH QL NAA+PROBE: NOT DETECTED
HPIV4 P GENE NPH QL NAA+PROBE: NOT DETECTED
LIPASE SERPL-CCNC: 65 U/L (ref 13–60)
LYMPHOCYTES # BLD AUTO: 0.4 10*3/MM3 (ref 0.7–3.1)
LYMPHOCYTES NFR BLD AUTO: 3.3 % (ref 19.6–45.3)
M PNEUMO IGG SER IA-ACNC: NOT DETECTED
MCH RBC QN AUTO: 34.1 PG (ref 26.6–33)
MCHC RBC AUTO-ENTMCNC: 35 G/DL (ref 31.5–35.7)
MCV RBC AUTO: 97.5 FL (ref 79–97)
MONOCYTES # BLD AUTO: 0.9 10*3/MM3 (ref 0.1–0.9)
MONOCYTES NFR BLD AUTO: 7.2 % (ref 5–12)
NEUTROPHILS NFR BLD AUTO: 10.7 10*3/MM3 (ref 1.7–7)
NEUTROPHILS NFR BLD AUTO: 89.2 % (ref 42.7–76)
NRBC BLD AUTO-RTO: 0.1 /100 WBC (ref 0–0.2)
PLATELET # BLD AUTO: 117 10*3/MM3 (ref 140–450)
PMV BLD AUTO: 9.2 FL (ref 6–12)
POTASSIUM SERPL-SCNC: 4.1 MMOL/L (ref 3.5–5.2)
PROT SERPL-MCNC: 9.2 G/DL (ref 6–8.5)
RBC # BLD AUTO: 5.11 10*6/MM3 (ref 4.14–5.8)
RHINOVIRUS RNA SPEC NAA+PROBE: NOT DETECTED
RSV RNA NPH QL NAA+NON-PROBE: NOT DETECTED
SARS-COV-2 RNA NPH QL NAA+NON-PROBE: NOT DETECTED
SODIUM SERPL-SCNC: 138 MMOL/L (ref 136–145)
WBC NRBC COR # BLD: 12 10*3/MM3 (ref 3.4–10.8)

## 2022-11-08 PROCEDURE — 74176 CT ABD & PELVIS W/O CONTRAST: CPT

## 2022-11-08 PROCEDURE — 25010000002 ONDANSETRON PER 1 MG: Performed by: PHYSICIAN ASSISTANT

## 2022-11-08 PROCEDURE — 82077 ASSAY SPEC XCP UR&BREATH IA: CPT | Performed by: PHYSICIAN ASSISTANT

## 2022-11-08 PROCEDURE — 96374 THER/PROPH/DIAG INJ IV PUSH: CPT

## 2022-11-08 PROCEDURE — 96375 TX/PRO/DX INJ NEW DRUG ADDON: CPT

## 2022-11-08 PROCEDURE — 83690 ASSAY OF LIPASE: CPT | Performed by: PHYSICIAN ASSISTANT

## 2022-11-08 PROCEDURE — 85025 COMPLETE CBC W/AUTO DIFF WBC: CPT | Performed by: PHYSICIAN ASSISTANT

## 2022-11-08 PROCEDURE — 36415 COLL VENOUS BLD VENIPUNCTURE: CPT

## 2022-11-08 PROCEDURE — 99283 EMERGENCY DEPT VISIT LOW MDM: CPT

## 2022-11-08 PROCEDURE — 80053 COMPREHEN METABOLIC PANEL: CPT | Performed by: PHYSICIAN ASSISTANT

## 2022-11-08 PROCEDURE — 0202U NFCT DS 22 TRGT SARS-COV-2: CPT | Performed by: PHYSICIAN ASSISTANT

## 2022-11-08 PROCEDURE — 25010000002 KETOROLAC TROMETHAMINE PER 15 MG: Performed by: PHYSICIAN ASSISTANT

## 2022-11-08 RX ORDER — SODIUM CHLORIDE 0.9 % (FLUSH) 0.9 %
10 SYRINGE (ML) INJECTION AS NEEDED
Status: DISCONTINUED | OUTPATIENT
Start: 2022-11-08 | End: 2022-11-09 | Stop reason: HOSPADM

## 2022-11-08 RX ORDER — KETOROLAC TROMETHAMINE 30 MG/ML
30 INJECTION, SOLUTION INTRAMUSCULAR; INTRAVENOUS ONCE
Status: COMPLETED | OUTPATIENT
Start: 2022-11-08 | End: 2022-11-08

## 2022-11-08 RX ORDER — ONDANSETRON 2 MG/ML
4 INJECTION INTRAMUSCULAR; INTRAVENOUS ONCE
Status: COMPLETED | OUTPATIENT
Start: 2022-11-08 | End: 2022-11-08

## 2022-11-08 RX ADMIN — SODIUM CHLORIDE 1000 ML: 0.9 INJECTION, SOLUTION INTRAVENOUS at 21:10

## 2022-11-08 RX ADMIN — ONDANSETRON 4 MG: 2 INJECTION INTRAMUSCULAR; INTRAVENOUS at 21:10

## 2022-11-08 RX ADMIN — PHENOL 1 SPRAY: 1.4 SPRAY ORAL at 21:59

## 2022-11-08 RX ADMIN — KETOROLAC TROMETHAMINE 30 MG: 30 INJECTION, SOLUTION INTRAMUSCULAR at 21:10

## 2022-11-09 VITALS
RESPIRATION RATE: 20 BRPM | HEIGHT: 73 IN | WEIGHT: 161.16 LBS | OXYGEN SATURATION: 97 % | DIASTOLIC BLOOD PRESSURE: 79 MMHG | SYSTOLIC BLOOD PRESSURE: 126 MMHG | TEMPERATURE: 97.5 F | HEART RATE: 82 BPM | BODY MASS INDEX: 21.36 KG/M2

## 2022-11-09 LAB
AMPHET+METHAMPHET UR QL: NEGATIVE
BACTERIA UR QL AUTO: ABNORMAL /HPF
BARBITURATES UR QL SCN: NEGATIVE
BENZODIAZ UR QL SCN: NEGATIVE
BILIRUB UR QL STRIP: ABNORMAL
CANNABINOIDS SERPL QL: NEGATIVE
CLARITY UR: ABNORMAL
COCAINE UR QL: NEGATIVE
COLOR UR: ABNORMAL
GLUCOSE UR STRIP-MCNC: NEGATIVE MG/DL
HGB UR QL STRIP.AUTO: NEGATIVE
HYALINE CASTS UR QL AUTO: ABNORMAL /LPF
KETONES UR QL STRIP: ABNORMAL
LEUKOCYTE ESTERASE UR QL STRIP.AUTO: ABNORMAL
METHADONE UR QL SCN: NEGATIVE
NITRITE UR QL STRIP: POSITIVE
OPIATES UR QL: NEGATIVE
OXYCODONE UR QL SCN: NEGATIVE
PH UR STRIP.AUTO: <=5 [PH] (ref 5–8)
PROT UR QL STRIP: ABNORMAL
RBC # UR STRIP: ABNORMAL /HPF
REF LAB TEST METHOD: ABNORMAL
SP GR UR STRIP: 1.03 (ref 1–1.03)
SQUAMOUS #/AREA URNS HPF: ABNORMAL /HPF
UROBILINOGEN UR QL STRIP: ABNORMAL
WBC # UR STRIP: ABNORMAL /HPF

## 2022-11-09 PROCEDURE — 80307 DRUG TEST PRSMV CHEM ANLYZR: CPT | Performed by: PHYSICIAN ASSISTANT

## 2022-11-09 PROCEDURE — 25010000002 DROPERIDOL PER 5 MG: Performed by: PHYSICIAN ASSISTANT

## 2022-11-09 PROCEDURE — 81001 URINALYSIS AUTO W/SCOPE: CPT | Performed by: PHYSICIAN ASSISTANT

## 2022-11-09 PROCEDURE — 96375 TX/PRO/DX INJ NEW DRUG ADDON: CPT

## 2022-11-09 RX ORDER — DROPERIDOL 2.5 MG/ML
1.25 INJECTION, SOLUTION INTRAMUSCULAR; INTRAVENOUS ONCE
Status: COMPLETED | OUTPATIENT
Start: 2022-11-09 | End: 2022-11-09

## 2022-11-09 RX ORDER — ONDANSETRON 4 MG/1
4 TABLET, ORALLY DISINTEGRATING ORAL EVERY 6 HOURS PRN
Qty: 30 TABLET | Refills: 0 | Status: ON HOLD | OUTPATIENT
Start: 2022-11-09

## 2022-11-09 RX ADMIN — SODIUM CHLORIDE 500 ML: 9 INJECTION, SOLUTION INTRAVENOUS at 00:53

## 2022-11-09 RX ADMIN — DROPERIDOL 1.25 MG: 2.5 INJECTION, SOLUTION INTRAMUSCULAR; INTRAVENOUS at 00:53

## 2022-11-09 NOTE — ED PROVIDER NOTES
Subjective     Patient is a 27-year-old male comes in complaining of abdominal pain, nausea and vomiting for the past 2 days.  Patient states that his pain is in his epigastric left upper quadrant area that is constant and at times will be worse.  Patient states pain is about a 7 out of 10 currently.  Patient states that he has had persistent nausea and vomiting the last 2 days.  Patient states that even with small amounts of water he will vomit this back up.  Patient states he did not try any Tylenol or ibuprofen for his pain.  Patient states he has not had a bowel movement about 2 days and usually stools at least once or Twice daily and patient attributes this to not keeping any food down.  Patient does report some subjective fever and chills and body aches but states he has not taken his temperature at home.  Patient denies any urinary symptoms, diarrhea.  Patient does report some sinus congestion but denies any productive cough.        Review of Systems   Constitutional: Positive for chills, fatigue and fever (subjective).   HENT: Positive for congestion. Negative for ear pain, sinus pressure, sore throat, tinnitus and trouble swallowing.    Eyes: Negative for photophobia, discharge and visual disturbance.   Respiratory: Negative for cough, chest tightness, shortness of breath and wheezing.    Cardiovascular: Negative for chest pain and leg swelling.   Gastrointestinal: Positive for abdominal pain, nausea and vomiting. Negative for diarrhea.   Genitourinary: Negative for dysuria, flank pain, frequency and urgency.   Musculoskeletal: Negative for arthralgias and myalgias.   Skin: Negative for rash.   Neurological: Negative for dizziness and headaches.   Psychiatric/Behavioral: Negative for confusion.       Past Medical History:   Diagnosis Date   • Seizures (HCC)     as a child and withdrawal related       No Known Allergies    Past Surgical History:   Procedure Laterality Date   • MYRINGOTOMY W/ TUBES     •  PILONIDAL CYSTECTOMY         History reviewed. No pertinent family history.    Social History     Socioeconomic History   • Marital status: Single   Tobacco Use   • Smoking status: Every Day     Packs/day: 0.50     Types: Cigarettes   • Smokeless tobacco: Never   Substance and Sexual Activity   • Alcohol use: Yes     Alcohol/week: 16.0 standard drinks     Types: 16 Shots of liquor per week   • Drug use: Yes     Types: Marijuana           Objective   Physical Exam  Vitals and nursing note reviewed.   Constitutional:       General: He is not in acute distress.     Appearance: He is well-developed. He is not diaphoretic.   HENT:      Head: Normocephalic and atraumatic.      Right Ear: External ear normal.      Left Ear: External ear normal.      Nose: Nose normal.      Mouth/Throat:      Pharynx: No oropharyngeal exudate.   Eyes:      Extraocular Movements: Extraocular movements intact.      Conjunctiva/sclera: Conjunctivae normal.      Pupils: Pupils are equal, round, and reactive to light.   Cardiovascular:      Rate and Rhythm: Normal rate and regular rhythm.      Pulses: Normal pulses.      Heart sounds: Normal heart sounds.      Comments: S1, S2 audible.  Pulmonary:      Effort: Pulmonary effort is normal. No respiratory distress.      Breath sounds: Normal breath sounds. No wheezing, rhonchi or rales.      Comments: On room air.  Abdominal:      General: Bowel sounds are normal. There is no distension.      Palpations: Abdomen is soft.      Tenderness: There is no abdominal tenderness. There is no right CVA tenderness, left CVA tenderness, guarding or rebound.   Musculoskeletal:         General: No tenderness or deformity. Normal range of motion.      Cervical back: Normal range of motion.      Right lower leg: No edema.      Left lower leg: No edema.   Skin:     General: Skin is warm.      Capillary Refill: Capillary refill takes less than 2 seconds.      Findings: No erythema or rash.   Neurological:       "General: No focal deficit present.      Mental Status: He is alert and oriented to person, place, and time. Mental status is at baseline.      Cranial Nerves: No cranial nerve deficit.      Sensory: No sensory deficit.      Motor: No weakness.   Psychiatric:         Mood and Affect: Mood normal.         Behavior: Behavior normal.         Procedures           ED Course  ED Course as of 11/09/22 0224   Tue Nov 08, 2022   2308 Awaiting urine [RL]   Wed Nov 09, 2022   0014 Awaiting urine, patient states he is unable to at this time and had another episode of vomiting here. Fluids and antinausea medicines were ordered. [RL]   0100 Awaiting urine [RL]   0157 Awaiting urine to result [RL]      ED Course User Index  [RL] Iftikhar Lewis PA      /94 (BP Location: Left arm, Patient Position: Sitting)   Pulse 83   Temp 97.5 °F (36.4 °C) (Temporal)   Resp 16   Ht 185.4 cm (73\")   Wt 73.1 kg (161 lb 2.5 oz)   SpO2 96%   BMI 21.26 kg/m²   Labs Reviewed   COMPREHENSIVE METABOLIC PANEL - Abnormal; Notable for the following components:       Result Value    Glucose 110 (*)     Chloride 84 (*)     Calcium 10.9 (*)     Total Protein 9.2 (*)     Albumin 5.80 (*)     ALT (SGPT) 45 (*)     AST (SGOT) 65 (*)     Alkaline Phosphatase 131 (*)     Anion Gap 30.0 (*)     All other components within normal limits    Narrative:     GFR Normal >60  Chronic Kidney Disease <60  Kidney Failure <15     LIPASE - Abnormal; Notable for the following components:    Lipase 65 (*)     All other components within normal limits   URINALYSIS W/ CULTURE IF INDICATED - Abnormal; Notable for the following components:    Color, UA Karlie (*)     Appearance, UA Cloudy (*)     Ketones, UA >=160 mg/dL (4+) (*)     Bilirubin, UA Large (3+) (*)     Protein, UA >=300 mg/dL (3+) (*)     Leuk Esterase, UA Small (1+) (*)     Nitrite, UA Positive (*)     All other components within normal limits    Narrative:     In absence of clinical symptoms, the presence of " pyuria, bacteria, and/or nitrites on the urinalysis result does not correlate with infection.   CBC WITH AUTO DIFFERENTIAL - Abnormal; Notable for the following components:    WBC 12.00 (*)     MCV 97.5 (*)     MCH 34.1 (*)     Platelets 117 (*)     Neutrophil % 89.2 (*)     Lymphocyte % 3.3 (*)     Eosinophil % 0.0 (*)     Neutrophils, Absolute 10.70 (*)     Lymphocytes, Absolute 0.40 (*)     All other components within normal limits   URINALYSIS, MICROSCOPIC ONLY - Abnormal; Notable for the following components:    RBC, UA 0-2 (*)     WBC, UA 0-2 (*)     Bacteria, UA Trace (*)     Squamous Epithelial Cells, UA 3-6 (*)     All other components within normal limits   RESPIRATORY PANEL PCR W/ COVID-19 (SARS-COV-2) TEODORO/CHANO/JANE/PAD/COR/MAD/JEAN IN-HOUSE, NP SWAB IN [facility], 3-4 HR TAT - Normal    Narrative:     In the setting of a positive respiratory panel with a viral infection PLUS a negative procalcitonin without other underlying concern for bacterial infection, consider observing off antibiotics or discontinuation of antibiotics and continue supportive care. If the respiratory panel is positive for atypical bacterial infection (Bordetella pertussis, Chlamydophila pneumoniae, or Mycoplasma pneumoniae), consider antibiotic de-escalation to target atypical bacterial infection.   URINE DRUG SCREEN - Normal    Narrative:     Negative Thresholds Per Drugs Screened:    Amphetamines                 500 ng/ml  Barbiturates                 200 ng/ml  Benzodiazepines              100 ng/ml  Cocaine                      300 ng/ml  Methadone                    300 ng/ml  Opiates                      300 ng/ml  Oxycodone                    100 ng/ml  THC                           50 ng/ml    The Normal Value for all drugs tested is negative. This report includes final unconfirmed screening results to be used for medical treatment purposes only. Unconfirmed results must not be used for non-medical purposes such as employment or  legal testing. Clinical consideration should be applied to any drug of abuse test, particularly when unconfirmed results are used.          All urine drugs of abuse requests without chain of custody are for medical screening purposes only.  False positives are possible.     ETHANOL    Narrative:     Plasma Ethanol Clinical Symptoms:    ETOH (%)               Clinical Symptom  .01-.05              No apparent influence  .03-.12              Euphoria, Diminished judgment and attention   .09-.25              Impaired comprehension, Muscle incoordination  .18-.30              Confusion, Staggered gait, Slurred speech  .25-.40              Markedly decreased response to stimuli, unable to stand or                        walk, vomitting, sleep or stupor  .35-.50              Comatose, Anesthesia, Subnormal body temperature       CBC AND DIFFERENTIAL    Narrative:     The following orders were created for panel order CBC & Differential.  Procedure                               Abnormality         Status                     ---------                               -----------         ------                     CBC Auto Differential[[id number]]        Abnormal            Final result                 Please view results for these tests on the individual orders.     CT Abdomen Pelvis Without Contrast    Result Date: 11/8/2022  1.     Hepatic steatosis. 2.     Moderate stool burden which may relate to constipation.  Electronically Signed By-Isaiah Middleton MD On:11/8/2022 10:47 PM This report was finalized on [id number] by  Isaiah Middleton MD.                                         [facility]     Chart review:  NKA  EKG:  Not indicated    Imaging:  See above  Labs: LFTs about at baseline.  UDS negative.  Ethanol level negative.  Lipase slightly elevated 65.  White blood cell count slightly elevated 12.0 likely reactively elevated secondary to vomiting.  Respiratory viral panel with COVID-19 swab negative.  UA shows 4+ ketones but  "otherwise trace bacteria and 0-2 WBCs as well as 3-6 squamous epithelial cells.  Serum creatinine slightly bumped at 1.24 otherwise unremarkable CMP other than findings above.  Vitals:  /94 (BP Location: Left arm, Patient Position: Sitting)   Pulse 83   Temp 97.5 °F (36.4 °C) (Temporal)   Resp 16   Ht 185.4 cm (73\")   Wt 73.1 kg (161 lb 2.5 oz)   SpO2 96%   BMI 21.26 kg/m²     Medications given:    Medications   sodium chloride 0.9 % flush 10 mL (has no administration in time range)   phenol (CHLORASEPTIC) 1.4 % liquid 1 spray (1 spray Mouth/Throat Given 11/8/22 2159)   ondansetron (ZOFRAN) injection 4 mg (4 mg Intravenous Given 11/8/22 2110)   sodium chloride 0.9 % bolus 1,000 mL (0 mL Intravenous Stopped 11/9/22 0018)   ketorolac (TORADOL) injection 30 mg (30 mg Intravenous Given 11/8/22 2110)   droperidol (INAPSINE) injection 1.25 mg (1.25 mg Intravenous Given 11/9/22 0053)   sodium chloride 0.9 % bolus 500 mL (500 mL Intravenous New Bag 11/9/22 0053)       Procedures:  Not indicated  MDM: Patient is a 27-year-old male who complains of vomiting and epigastric pain.  Patient is nontender on abdominal exam.  IV established.  LFTs about at baseline.  UDS negative.  Ethanol level negative.  Lipase slightly elevated 65.  White blood cell count slightly elevated 12.0 likely reactively elevated secondary to vomiting.  Respiratory viral panel with COVID-19 swab negative.  UA shows 4+ ketones but otherwise trace bacteria and 0-2 WBCs as well as 3-6 squamous epithelial cells.  Serum creatinine slightly bumped at 1.24 otherwise unremarkable CMP other than findings above.  Patient was given Zofran as well as Toradol with significant relief of patient's pain.  Patient was given total of 1.5 L normal saline.  Patient initially unable to tolerate liquids by mouth and subsequent dose of droperidol was given and patient able to tolerate liquids without issue upon discharge.  Patient appeared in no acute distress on " reevaluation.  Patient was given Chloraseptic spray as he reported some discomfort in his throat from vomiting.  Patient was instructed to follow-up with primary care regarding liver enzymes and follow-up with his symptoms and voiced understanding.  Patient discharged home with Zofran and given strict return precautions and voiced understanding. See full discharge instructions for further details.  Results and plan discussed with patient and is agreeable with plan.     Final diagnoses:   Nausea and vomiting, unspecified vomiting type   Left upper quadrant abdominal pain   Gastroenteritis   Dehydration       ED Disposition  ED Disposition     ED Disposition   Discharge    Condition   Stable    Comment   --             [facility] EMERGENCY DEPARTMENT  [address] [address] [phone]  Go in 1 day  As needed, If symptoms worsen    PATIENT CONNECTION - [facility] [address]  [phone]  Call in 1 week  As needed         Medication List      New Prescriptions    ondansetron ODT 4 MG disintegrating tablet  Commonly known as: ZOFRAN-ODT  Place 1 tablet on the tongue Every 6 (Six) Hours As Needed for Vomiting or Nausea.           Where to Get Your Medications      These medications were sent to [id number] - [place], [place] - [address] AT [address] - [phone]  - [phone] FX  [address] [place] [address]    Phone: [phone]   · ondansetron ODT 4 MG disintegrating tablet          Iftikhar Lewis PA  11/09/22 0224

## 2022-11-09 NOTE — DISCHARGE INSTRUCTIONS
Please take Zofran as needed for nausea and vomiting.  Please return to the ER if you spike a high fever or have severe pain or cannot tolerate liquids by mouth despite Zofran as you will need reevaluation that time.  Please follow-up with your primary care provider regarding your liver enzymes and fatty liver.

## 2023-04-06 ENCOUNTER — HOSPITAL ENCOUNTER (OUTPATIENT)
Facility: HOSPITAL | Age: 28
Setting detail: OBSERVATION
Discharge: HOME | End: 2023-04-07
Attending: EMERGENCY MEDICINE | Admitting: INTERNAL MEDICINE
Payer: COMMERCIAL

## 2023-04-06 ENCOUNTER — APPOINTMENT (OUTPATIENT)
Dept: CT IMAGING | Facility: HOSPITAL | Age: 28
End: 2023-04-06
Payer: COMMERCIAL

## 2023-04-06 DIAGNOSIS — R11.15 PERSISTENT VOMITING: Primary | ICD-10-CM

## 2023-04-06 DIAGNOSIS — K75.9 HEPATITIS: ICD-10-CM

## 2023-04-06 DIAGNOSIS — K29.20 ACUTE ALCOHOLIC GASTRITIS WITHOUT HEMORRHAGE: ICD-10-CM

## 2023-04-06 PROBLEM — R74.8 ELEVATED LIVER ENZYMES: Status: ACTIVE | Noted: 2023-04-06

## 2023-04-06 PROBLEM — F10.10 ALCOHOL ABUSE: Status: ACTIVE | Noted: 2023-04-06

## 2023-04-06 PROBLEM — F12.10 MARIJUANA ABUSE: Status: ACTIVE | Noted: 2023-04-06

## 2023-04-06 LAB
ALBUMIN SERPL-MCNC: 5.4 G/DL (ref 3.5–5.2)
ALBUMIN/GLOB SERPL: 1.9 G/DL
ALP SERPL-CCNC: 142 U/L (ref 39–117)
ALT SERPL W P-5'-P-CCNC: 139 U/L (ref 1–41)
AMPHET+METHAMPHET UR QL: NEGATIVE
ANION GAP SERPL CALCULATED.3IONS-SCNC: 17 MMOL/L (ref 5–15)
AST SERPL-CCNC: 287 U/L (ref 1–40)
BACTERIA UR QL AUTO: ABNORMAL /HPF
BARBITURATES UR QL SCN: NEGATIVE
BASOPHILS # BLD AUTO: 0 10*3/MM3 (ref 0–0.2)
BASOPHILS NFR BLD AUTO: 0.5 % (ref 0–1.5)
BENZODIAZ UR QL SCN: NEGATIVE
BILIRUB SERPL-MCNC: 2.1 MG/DL (ref 0–1.2)
BILIRUB UR QL STRIP: NEGATIVE
BUN SERPL-MCNC: 8 MG/DL (ref 6–20)
BUN/CREAT SERPL: 8.8 (ref 7–25)
CALCIUM SPEC-SCNC: 9.5 MG/DL (ref 8.6–10.5)
CANNABINOIDS SERPL QL: POSITIVE
CHLORIDE SERPL-SCNC: 84 MMOL/L (ref 98–107)
CLARITY UR: CLEAR
CO2 SERPL-SCNC: 35 MMOL/L (ref 22–29)
COCAINE UR QL: NEGATIVE
COLOR UR: ABNORMAL
CREAT SERPL-MCNC: 0.91 MG/DL (ref 0.76–1.27)
DEPRECATED RDW RBC AUTO: 47.7 FL (ref 37–54)
EGFRCR SERPLBLD CKD-EPI 2021: 117 ML/MIN/1.73
EOSINOPHIL # BLD AUTO: 0 10*3/MM3 (ref 0–0.4)
EOSINOPHIL NFR BLD AUTO: 0.1 % (ref 0.3–6.2)
ERYTHROCYTE [DISTWIDTH] IN BLOOD BY AUTOMATED COUNT: 13.7 % (ref 12.3–15.4)
ETHANOL UR QL: <0.01 %
GLOBULIN UR ELPH-MCNC: 2.8 GM/DL
GLUCOSE SERPL-MCNC: 132 MG/DL (ref 65–99)
GLUCOSE UR STRIP-MCNC: NEGATIVE MG/DL
HCT VFR BLD AUTO: 47.3 % (ref 37.5–51)
HGB BLD-MCNC: 16.8 G/DL (ref 13–17.7)
HGB UR QL STRIP.AUTO: NEGATIVE
HYALINE CASTS UR QL AUTO: ABNORMAL /LPF
KETONES UR QL STRIP: NEGATIVE
LEUKOCYTE ESTERASE UR QL STRIP.AUTO: NEGATIVE
LIPASE SERPL-CCNC: 18 U/L (ref 13–60)
LYMPHOCYTES # BLD AUTO: 0.5 10*3/MM3 (ref 0.7–3.1)
LYMPHOCYTES NFR BLD AUTO: 8.6 % (ref 19.6–45.3)
MCH RBC QN AUTO: 33.4 PG (ref 26.6–33)
MCHC RBC AUTO-ENTMCNC: 35.6 G/DL (ref 31.5–35.7)
MCV RBC AUTO: 93.9 FL (ref 79–97)
METHADONE UR QL SCN: NEGATIVE
MONOCYTES # BLD AUTO: 0.8 10*3/MM3 (ref 0.1–0.9)
MONOCYTES NFR BLD AUTO: 13 % (ref 5–12)
NEUTROPHILS NFR BLD AUTO: 4.6 10*3/MM3 (ref 1.7–7)
NEUTROPHILS NFR BLD AUTO: 77.8 % (ref 42.7–76)
NITRITE UR QL STRIP: NEGATIVE
NRBC BLD AUTO-RTO: 0.1 /100 WBC (ref 0–0.2)
OPIATES UR QL: NEGATIVE
OXYCODONE UR QL SCN: NEGATIVE
PH UR STRIP.AUTO: 8.5 [PH] (ref 5–8)
PLATELET # BLD AUTO: 110 10*3/MM3 (ref 140–450)
PMV BLD AUTO: 9.6 FL (ref 6–12)
POTASSIUM SERPL-SCNC: 3.2 MMOL/L (ref 3.5–5.2)
PROT SERPL-MCNC: 8.2 G/DL (ref 6–8.5)
PROT UR QL STRIP: ABNORMAL
RBC # BLD AUTO: 5.04 10*6/MM3 (ref 4.14–5.8)
RBC # UR STRIP: ABNORMAL /HPF
REF LAB TEST METHOD: ABNORMAL
SODIUM SERPL-SCNC: 136 MMOL/L (ref 136–145)
SP GR UR STRIP: 1.09 (ref 1–1.03)
SQUAMOUS #/AREA URNS HPF: ABNORMAL /HPF
UROBILINOGEN UR QL STRIP: ABNORMAL
WBC # UR STRIP: ABNORMAL /HPF
WBC NRBC COR # BLD: 6 10*3/MM3 (ref 3.4–10.8)

## 2023-04-06 PROCEDURE — 25010000002 ONDANSETRON PER 1 MG: Performed by: PHYSICIAN ASSISTANT

## 2023-04-06 PROCEDURE — 80307 DRUG TEST PRSMV CHEM ANLYZR: CPT | Performed by: PHYSICIAN ASSISTANT

## 2023-04-06 PROCEDURE — 85025 COMPLETE CBC W/AUTO DIFF WBC: CPT | Performed by: PHYSICIAN ASSISTANT

## 2023-04-06 PROCEDURE — 81001 URINALYSIS AUTO W/SCOPE: CPT | Performed by: PHYSICIAN ASSISTANT

## 2023-04-06 PROCEDURE — 25010000002 ONDANSETRON PER 1 MG: Performed by: EMERGENCY MEDICINE

## 2023-04-06 PROCEDURE — 82077 ASSAY SPEC XCP UR&BREATH IA: CPT | Performed by: EMERGENCY MEDICINE

## 2023-04-06 PROCEDURE — 96375 TX/PRO/DX INJ NEW DRUG ADDON: CPT

## 2023-04-06 PROCEDURE — G0378 HOSPITAL OBSERVATION PER HR: HCPCS

## 2023-04-06 PROCEDURE — 96374 THER/PROPH/DIAG INJ IV PUSH: CPT

## 2023-04-06 PROCEDURE — 96376 TX/PRO/DX INJ SAME DRUG ADON: CPT

## 2023-04-06 PROCEDURE — 83690 ASSAY OF LIPASE: CPT | Performed by: PHYSICIAN ASSISTANT

## 2023-04-06 PROCEDURE — 99284 EMERGENCY DEPT VISIT MOD MDM: CPT

## 2023-04-06 PROCEDURE — 25510000001 IOPAMIDOL PER 1 ML: Performed by: EMERGENCY MEDICINE

## 2023-04-06 PROCEDURE — 80053 COMPREHEN METABOLIC PANEL: CPT | Performed by: PHYSICIAN ASSISTANT

## 2023-04-06 PROCEDURE — 74177 CT ABD & PELVIS W/CONTRAST: CPT

## 2023-04-06 RX ORDER — LORAZEPAM 2 MG/ML
2 INJECTION INTRAMUSCULAR
Status: DISCONTINUED | OUTPATIENT
Start: 2023-04-06 | End: 2023-04-07 | Stop reason: HOSPADM

## 2023-04-06 RX ORDER — LORAZEPAM 1 MG/1
1 TABLET ORAL
Status: DISCONTINUED | OUTPATIENT
Start: 2023-04-06 | End: 2023-04-07 | Stop reason: HOSPADM

## 2023-04-06 RX ORDER — ACETAMINOPHEN 650 MG/1
650 SUPPOSITORY RECTAL EVERY 4 HOURS PRN
Status: DISCONTINUED | OUTPATIENT
Start: 2023-04-06 | End: 2023-04-06

## 2023-04-06 RX ORDER — SODIUM CHLORIDE 9 MG/ML
40 INJECTION, SOLUTION INTRAVENOUS AS NEEDED
Status: DISCONTINUED | OUTPATIENT
Start: 2023-04-06 | End: 2023-04-07 | Stop reason: HOSPADM

## 2023-04-06 RX ORDER — LORAZEPAM 2 MG/ML
1 INJECTION INTRAMUSCULAR
Status: DISCONTINUED | OUTPATIENT
Start: 2023-04-06 | End: 2023-04-07 | Stop reason: HOSPADM

## 2023-04-06 RX ORDER — ACETAMINOPHEN 325 MG/1
650 TABLET ORAL EVERY 4 HOURS PRN
Status: DISCONTINUED | OUTPATIENT
Start: 2023-04-06 | End: 2023-04-06

## 2023-04-06 RX ORDER — SODIUM CHLORIDE 0.9 % (FLUSH) 0.9 %
10 SYRINGE (ML) INJECTION AS NEEDED
Status: DISCONTINUED | OUTPATIENT
Start: 2023-04-06 | End: 2023-04-07 | Stop reason: HOSPADM

## 2023-04-06 RX ORDER — KETOROLAC TROMETHAMINE 15 MG/ML
15 INJECTION, SOLUTION INTRAMUSCULAR; INTRAVENOUS EVERY 6 HOURS PRN
Status: DISCONTINUED | OUTPATIENT
Start: 2023-04-06 | End: 2023-04-07 | Stop reason: HOSPADM

## 2023-04-06 RX ORDER — NICOTINE 21 MG/24HR
1 PATCH, TRANSDERMAL 24 HOURS TRANSDERMAL NIGHTLY
Status: DISCONTINUED | OUTPATIENT
Start: 2023-04-06 | End: 2023-04-07 | Stop reason: HOSPADM

## 2023-04-06 RX ORDER — ACETAMINOPHEN 160 MG/5ML
650 SOLUTION ORAL EVERY 4 HOURS PRN
Status: DISCONTINUED | OUTPATIENT
Start: 2023-04-06 | End: 2023-04-06

## 2023-04-06 RX ORDER — SODIUM CHLORIDE 0.9 % (FLUSH) 0.9 %
10 SYRINGE (ML) INJECTION EVERY 12 HOURS SCHEDULED
Status: DISCONTINUED | OUTPATIENT
Start: 2023-04-06 | End: 2023-04-07 | Stop reason: HOSPADM

## 2023-04-06 RX ORDER — FAMOTIDINE 10 MG/ML
20 INJECTION, SOLUTION INTRAVENOUS ONCE
Status: COMPLETED | OUTPATIENT
Start: 2023-04-06 | End: 2023-04-06

## 2023-04-06 RX ORDER — NALTREXONE HYDROCHLORIDE 50 MG/1
50 TABLET, FILM COATED ORAL DAILY
COMMUNITY

## 2023-04-06 RX ORDER — LORAZEPAM 1 MG/1
2 TABLET ORAL
Status: DISCONTINUED | OUTPATIENT
Start: 2023-04-06 | End: 2023-04-07 | Stop reason: HOSPADM

## 2023-04-06 RX ORDER — ONDANSETRON 4 MG/1
4 TABLET, FILM COATED ORAL EVERY 6 HOURS PRN
Status: DISCONTINUED | OUTPATIENT
Start: 2023-04-06 | End: 2023-04-07 | Stop reason: HOSPADM

## 2023-04-06 RX ORDER — ONDANSETRON 2 MG/ML
4 INJECTION INTRAMUSCULAR; INTRAVENOUS ONCE
Status: COMPLETED | OUTPATIENT
Start: 2023-04-06 | End: 2023-04-06

## 2023-04-06 RX ORDER — SODIUM CHLORIDE 9 MG/ML
125 INJECTION, SOLUTION INTRAVENOUS CONTINUOUS
Status: DISCONTINUED | OUTPATIENT
Start: 2023-04-06 | End: 2023-04-07 | Stop reason: HOSPADM

## 2023-04-06 RX ORDER — TRAZODONE HYDROCHLORIDE 100 MG/1
100 TABLET ORAL NIGHTLY
COMMUNITY

## 2023-04-06 RX ORDER — ONDANSETRON 2 MG/ML
4 INJECTION INTRAMUSCULAR; INTRAVENOUS EVERY 6 HOURS PRN
Status: DISCONTINUED | OUTPATIENT
Start: 2023-04-06 | End: 2023-04-07 | Stop reason: HOSPADM

## 2023-04-06 RX ORDER — SERTRALINE HYDROCHLORIDE 100 MG/1
100 TABLET, FILM COATED ORAL DAILY
COMMUNITY

## 2023-04-06 RX ORDER — CHOLECALCIFEROL (VITAMIN D3) 125 MCG
5 CAPSULE ORAL NIGHTLY PRN
Status: DISCONTINUED | OUTPATIENT
Start: 2023-04-06 | End: 2023-04-07 | Stop reason: HOSPADM

## 2023-04-06 RX ADMIN — IOPAMIDOL 100 ML: 755 INJECTION, SOLUTION INTRAVENOUS at 20:23

## 2023-04-06 RX ADMIN — ONDANSETRON 4 MG: 2 INJECTION INTRAMUSCULAR; INTRAVENOUS at 19:42

## 2023-04-06 RX ADMIN — ONDANSETRON 4 MG: 2 INJECTION INTRAMUSCULAR; INTRAVENOUS at 20:43

## 2023-04-06 RX ADMIN — FAMOTIDINE 20 MG: 10 INJECTION INTRAVENOUS at 20:43

## 2023-04-06 RX ADMIN — SODIUM CHLORIDE 1000 ML: 9 INJECTION, SOLUTION INTRAVENOUS at 19:42

## 2023-04-06 NOTE — Clinical Note
Level of Care: Telemetry [5]   Admitting Physician: SHERRI STEELE [[id number]]   Attending Physician: SHERRI STEELE [[id number]]

## 2023-04-06 NOTE — ED PROVIDER NOTES
"Subjective   History of Present Illness   Provider in Triage Note  Patient is a 27-year-old male who presents with nausea/vomiting and diarrhea since yesterday evening.  Patient reports left upper quadrant abdominal pain that he believes is from vomiting.  Patient reports about 25 episodes of vomiting today.  Patient denies hematemesis or melena.  Patient denies recent illness or being around anyone who has been sick.  Denies eating any new or abnormal foods.  Denies any any recent changes to medicine.  Upon assessment patient is in no acute distress but is tender to palpation in the left upper quadrant.    Agree with pit note adding patient describes chronic alcoholism with daily rum drinking    Review of Systems    Past Medical History:   Diagnosis Date   • Seizures     as a child and withdrawal related       No Known Allergies    Past Surgical History:   Procedure Laterality Date   • MYRINGOTOMY W/ TUBES     • PILONIDAL CYSTECTOMY         No family history on file.    Social History     Socioeconomic History   • Marital status: Single   Tobacco Use   • Smoking status: Every Day     Packs/day: 0.50     Types: Cigarettes   • Smokeless tobacco: Never   Substance and Sexual Activity   • Alcohol use: Yes     Alcohol/week: 16.0 standard drinks     Types: 16 Shots of liquor per week   • Drug use: Yes     Types: Marijuana     Prior to Admission medications    Medication Sig Start Date End Date Taking? Authorizing Provider   ondansetron ODT (ZOFRAN-ODT) 4 MG disintegrating tablet Place 1 tablet on the tongue Every 6 (Six) Hours As Needed for Vomiting or Nausea. 11/9/22   Iftikhar Lewis PA     /87   Pulse 82   Temp 97.7 °F (36.5 °C) (Oral)   Resp 18   Ht 185.4 cm (73\")   Wt 73.9 kg (162 lb 14.7 oz)   SpO2 96%   BMI 21.49 kg/m²   I examined the patient using the appropriate personal protective equipment.          Objective   Physical Exam  General: Well-developed well-appearing, no acute distress, alert and " appropriate  Eyes: Pupils round and normal, sclera nonicteric  HEENT: Mucous membranes dry, no mucosal swelling  Neck: Supple, no nuchal rigidity,   Respirations: Respirations nonlabored, equal breath sounds bilaterally, clear lungs  Heart regular rate and rhythm, no murmurs rubs or gallops,   Abdomen soft, mildly tender palpation in the mid abdomen, no rebound or guarding, nondistended, no hepatosplenomegaly, no hernia, no mass, normal bowel sounds, no CVA tenderness  Extremities no clubbing cyanosis or edema, calves are symmetric and nontender  Neuro cranial nerves grossly intact, no focal limb deficits  Psych oriented, pleasant affect  Skin no rash, brisk cap refill  Procedures           ED Course      Results for orders placed or performed during the hospital encounter of 04/06/23   Comprehensive Metabolic Panel    Specimen: Blood   Result Value Ref Range    Glucose 132 (H) 65 - 99 mg/dL    BUN 8 6 - 20 mg/dL    Creatinine 0.91 0.76 - 1.27 mg/dL    Sodium 136 136 - 145 mmol/L    Potassium 3.2 (L) 3.5 - 5.2 mmol/L    Chloride 84 (L) 98 - 107 mmol/L    CO2 35.0 (H) 22.0 - 29.0 mmol/L    Calcium 9.5 8.6 - 10.5 mg/dL    Total Protein 8.2 6.0 - 8.5 g/dL    Albumin 5.4 (H) 3.5 - 5.2 g/dL    ALT (SGPT) 139 (H) 1 - 41 U/L    AST (SGOT) 287 (H) 1 - 40 U/L    Alkaline Phosphatase 142 (H) 39 - 117 U/L    Total Bilirubin 2.1 (H) 0.0 - 1.2 mg/dL    Globulin 2.8 gm/dL    A/G Ratio 1.9 g/dL    BUN/Creatinine Ratio 8.8 7.0 - 25.0    Anion Gap 17.0 (H) 5.0 - 15.0 mmol/L    eGFR 117.0 >60.0 mL/min/1.73   Lipase    Specimen: Blood   Result Value Ref Range    Lipase 18 13 - 60 U/L   CBC Auto Differential    Specimen: Blood   Result Value Ref Range    WBC 6.00 3.40 - 10.80 10*3/mm3    RBC 5.04 4.14 - 5.80 10*6/mm3    Hemoglobin 16.8 13.0 - 17.7 g/dL    Hematocrit 47.3 37.5 - 51.0 %    MCV 93.9 79.0 - 97.0 fL    MCH 33.4 (H) 26.6 - 33.0 pg    MCHC 35.6 31.5 - 35.7 g/dL    RDW 13.7 12.3 - 15.4 %    RDW-SD 47.7 37.0 - 54.0 fl    MPV  9.6 6.0 - 12.0 fL    Platelets 110 (L) 140 - 450 10*3/mm3    Neutrophil % 77.8 (H) 42.7 - 76.0 %    Lymphocyte % 8.6 (L) 19.6 - 45.3 %    Monocyte % 13.0 (H) 5.0 - 12.0 %    Eosinophil % 0.1 (L) 0.3 - 6.2 %    Basophil % 0.5 0.0 - 1.5 %    Neutrophils, Absolute 4.60 1.70 - 7.00 10*3/mm3    Lymphocytes, Absolute 0.50 (L) 0.70 - 3.10 10*3/mm3    Monocytes, Absolute 0.80 0.10 - 0.90 10*3/mm3    Eosinophils, Absolute 0.00 0.00 - 0.40 10*3/mm3    Basophils, Absolute 0.00 0.00 - 0.20 10*3/mm3    nRBC 0.1 0.0 - 0.2 /100 WBC     CT Abdomen Pelvis With Contrast    Result Date: 4/6/2023  Impression: 1.Hepatic steatosis Electronically Signed: Isaiah Middleton  4/6/2023 8:42 PM EDT  Workstation ID: [id number]                                         Medical Decision Making  Patient presents with vomiting diarrhea differential diagnosis including bowel obstruction, pancreatitis, gastritis, peritonitis, cholecystitis, ischemic bowel    Patient is a benign abdominal examination with no signs of peritonitis or acute abdomen.  He did have some persistent symptoms of vomiting and nausea throughout the emergency room course.  He was ordered IV fluids and IV Pepcid and Zofran.  CT scan shows no apparent obstruction.  He does have fatty liver.  I suspect this is secondary to his chronic alcohol abuse.  His liver enzymes are elevated including bilirubin elevation.  Due to his persistent symptoms and need for further supportive care and evaluation he will be admitted for further management.  Patient is agreeable to the plan of admission.  Case and findings discussed with Chary with the hospitalist service for admission.    Acute alcoholic gastritis without hemorrhage: acute illness or injury  Hepatitis: acute illness or injury  Persistent vomiting: acute illness or injury  Amount and/or Complexity of Data Reviewed  Labs: ordered. Decision-making details documented in ED Course.     Details: Elevated liver enzymes and bilirubin, no leukocytosis,  mild thrombocytopenia, normal lipase, urinalysis pending at the time of admission, alcohol pending  Radiology: ordered and independent interpretation performed.     Details: Independent interpretation of the CT abdomen images no apparent bowel obstruction      Risk  Prescription drug management.  Decision regarding hospitalization.          Final diagnoses:   Persistent vomiting   Acute alcoholic gastritis without hemorrhage   Hepatitis       ED Disposition  ED Disposition     ED Disposition   Decision to Admit    Condition   --    Comment   Level of Care: Telemetry [5]   Admitting Physician: SHERRI STEELE [[id number]]   Attending Physician: SHERRI STEELE [[id number]]               No follow-up provider specified.       Medication List      No changes were made to your prescriptions during this visit.          Anthnoy Henning MD  04/06/23 0985

## 2023-04-07 ENCOUNTER — APPOINTMENT (OUTPATIENT)
Dept: ULTRASOUND IMAGING | Facility: HOSPITAL | Age: 28
End: 2023-04-07
Payer: COMMERCIAL

## 2023-04-07 ENCOUNTER — ON CAMPUS - OUTPATIENT (OUTPATIENT)
Dept: URBAN - METROPOLITAN AREA HOSPITAL 85 | Facility: HOSPITAL | Age: 28
End: 2023-04-07

## 2023-04-07 VITALS
OXYGEN SATURATION: 100 % | HEIGHT: 72 IN | SYSTOLIC BLOOD PRESSURE: 127 MMHG | TEMPERATURE: 98.3 F | DIASTOLIC BLOOD PRESSURE: 93 MMHG | HEART RATE: 74 BPM | BODY MASS INDEX: 22.43 KG/M2 | RESPIRATION RATE: 18 BRPM | WEIGHT: 165.57 LBS

## 2023-04-07 DIAGNOSIS — R74.01 ELEVATION OF LEVELS OF LIVER TRANSAMINASE LEVELS: ICD-10-CM

## 2023-04-07 DIAGNOSIS — R11.2 NAUSEA WITH VOMITING, UNSPECIFIED: ICD-10-CM

## 2023-04-07 DIAGNOSIS — F12.929 CANNABIS USE, UNSPECIFIED WITH INTOXICATION, UNSPECIFIED: ICD-10-CM

## 2023-04-07 DIAGNOSIS — F17.200 NICOTINE DEPENDENCE, UNSPECIFIED, UNCOMPLICATED: ICD-10-CM

## 2023-04-07 DIAGNOSIS — F10.10 ALCOHOL ABUSE, UNCOMPLICATED: ICD-10-CM

## 2023-04-07 DIAGNOSIS — R10.84 GENERALIZED ABDOMINAL PAIN: ICD-10-CM

## 2023-04-07 PROBLEM — E87.6 ACUTE HYPOKALEMIA: Status: ACTIVE | Noted: 2023-04-07

## 2023-04-07 PROBLEM — F12.188 CANNABIS HYPEREMESIS SYNDROME CONCURRENT WITH AND DUE TO CANNABIS ABUSE: Status: ACTIVE | Noted: 2023-04-07

## 2023-04-07 PROBLEM — K29.20 ACUTE ALCOHOLIC GASTRITIS WITHOUT HEMORRHAGE: Status: ACTIVE | Noted: 2023-04-07

## 2023-04-07 LAB
ADV 40+41 DNA STL QL NAA+NON-PROBE: NOT DETECTED
ALBUMIN SERPL-MCNC: 4.7 G/DL (ref 3.5–5.2)
ALP SERPL-CCNC: 121 U/L (ref 39–117)
ALT SERPL W P-5'-P-CCNC: 117 U/L (ref 1–41)
ANION GAP SERPL CALCULATED.3IONS-SCNC: 14 MMOL/L (ref 5–15)
AST SERPL-CCNC: 271 U/L (ref 1–40)
ASTRO TYP 1-8 RNA STL QL NAA+NON-PROBE: NOT DETECTED
BASOPHILS # BLD AUTO: 0 10*3/MM3 (ref 0–0.2)
BASOPHILS NFR BLD AUTO: 0.7 % (ref 0–1.5)
BILIRUB CONJ SERPL-MCNC: 0.7 MG/DL (ref 0–0.3)
BILIRUB INDIRECT SERPL-MCNC: 1.3 MG/DL
BILIRUB SERPL-MCNC: 2 MG/DL (ref 0–1.2)
BUN SERPL-MCNC: 7 MG/DL (ref 6–20)
BUN/CREAT SERPL: 7.6 (ref 7–25)
C CAYETANENSIS DNA STL QL NAA+NON-PROBE: NOT DETECTED
C COLI+JEJ+UPSA DNA STL QL NAA+NON-PROBE: NOT DETECTED
CALCIUM SPEC-SCNC: 8.5 MG/DL (ref 8.6–10.5)
CHLORIDE SERPL-SCNC: 93 MMOL/L (ref 98–107)
CO2 SERPL-SCNC: 31 MMOL/L (ref 22–29)
CREAT SERPL-MCNC: 0.92 MG/DL (ref 0.76–1.27)
CRYPTOSP DNA STL QL NAA+NON-PROBE: NOT DETECTED
DEPRECATED RDW RBC AUTO: 48.6 FL (ref 37–54)
E HISTOLYT DNA STL QL NAA+NON-PROBE: NOT DETECTED
EAEC PAA PLAS AGGR+AATA ST NAA+NON-PRB: NOT DETECTED
EC STX1+STX2 GENES STL QL NAA+NON-PROBE: NOT DETECTED
EGFRCR SERPLBLD CKD-EPI 2021: 115.5 ML/MIN/1.73
EOSINOPHIL # BLD AUTO: 0.1 10*3/MM3 (ref 0–0.4)
EOSINOPHIL NFR BLD AUTO: 1 % (ref 0.3–6.2)
EPEC EAE GENE STL QL NAA+NON-PROBE: NOT DETECTED
ERYTHROCYTE [DISTWIDTH] IN BLOOD BY AUTOMATED COUNT: 13.7 % (ref 12.3–15.4)
ETEC LTA+ST1A+ST1B TOX ST NAA+NON-PROBE: NOT DETECTED
FERRITIN SERPL-MCNC: ABNORMAL NG/ML (ref 30–400)
G LAMBLIA DNA STL QL NAA+NON-PROBE: NOT DETECTED
GLUCOSE BLDC GLUCOMTR-MCNC: 97 MG/DL (ref 70–105)
GLUCOSE SERPL-MCNC: 83 MG/DL (ref 65–99)
HAV IGM SERPL QL IA: NORMAL
HBV CORE IGM SERPL QL IA: NORMAL
HBV SURFACE AG SERPL QL IA: NORMAL
HCT VFR BLD AUTO: 42 % (ref 37.5–51)
HCV AB SER DONR QL: NORMAL
HGB BLD-MCNC: 14.6 G/DL (ref 13–17.7)
IGA1 MFR SER: 363 MG/DL (ref 70–400)
IGG1 SER-MCNC: 737 MG/DL (ref 700–1600)
IGM SERPL-MCNC: 139 MG/DL (ref 40–230)
IRON 24H UR-MRATE: 191 MCG/DL (ref 59–158)
IRON SATN MFR SERPL: 72 % (ref 20–50)
LYMPHOCYTES # BLD AUTO: 1.1 10*3/MM3 (ref 0.7–3.1)
LYMPHOCYTES NFR BLD AUTO: 20.4 % (ref 19.6–45.3)
MAGNESIUM SERPL-MCNC: 1.1 MG/DL (ref 1.6–2.6)
MCH RBC QN AUTO: 33.5 PG (ref 26.6–33)
MCHC RBC AUTO-ENTMCNC: 34.9 G/DL (ref 31.5–35.7)
MCV RBC AUTO: 96 FL (ref 79–97)
MONOCYTES # BLD AUTO: 0.5 10*3/MM3 (ref 0.1–0.9)
MONOCYTES NFR BLD AUTO: 10.3 % (ref 5–12)
NEUTROPHILS NFR BLD AUTO: 3.6 10*3/MM3 (ref 1.7–7)
NEUTROPHILS NFR BLD AUTO: 67.6 % (ref 42.7–76)
NOROVIRUS GI+II RNA STL QL NAA+NON-PROBE: NOT DETECTED
NRBC BLD AUTO-RTO: 0.1 /100 WBC (ref 0–0.2)
P SHIGELLOIDES DNA STL QL NAA+NON-PROBE: NOT DETECTED
PLATELET # BLD AUTO: 89 10*3/MM3 (ref 140–450)
PMV BLD AUTO: 9.8 FL (ref 6–12)
POTASSIUM SERPL-SCNC: 3.5 MMOL/L (ref 3.5–5.2)
PROT SERPL-MCNC: 7.3 G/DL (ref 6–8.5)
RBC # BLD AUTO: 4.37 10*6/MM3 (ref 4.14–5.8)
RVA RNA STL QL NAA+NON-PROBE: NOT DETECTED
S ENT+BONG DNA STL QL NAA+NON-PROBE: NOT DETECTED
SAPO I+II+IV+V RNA STL QL NAA+NON-PROBE: NOT DETECTED
SHIGELLA SP+EIEC IPAH ST NAA+NON-PROBE: NOT DETECTED
SODIUM SERPL-SCNC: 138 MMOL/L (ref 136–145)
TIBC SERPL-MCNC: 267 MCG/DL (ref 298–536)
TRANSFERRIN SERPL-MCNC: 179 MG/DL (ref 200–360)
V CHOL+PARA+VUL DNA STL QL NAA+NON-PROBE: NOT DETECTED
V CHOLERAE DNA STL QL NAA+NON-PROBE: NOT DETECTED
WBC NRBC COR # BLD: 5.3 10*3/MM3 (ref 3.4–10.8)
Y ENTEROCOL DNA STL QL NAA+NON-PROBE: NOT DETECTED

## 2023-04-07 PROCEDURE — 84466 ASSAY OF TRANSFERRIN: CPT

## 2023-04-07 PROCEDURE — 82962 GLUCOSE BLOOD TEST: CPT

## 2023-04-07 PROCEDURE — 83540 ASSAY OF IRON: CPT

## 2023-04-07 PROCEDURE — 96361 HYDRATE IV INFUSION ADD-ON: CPT

## 2023-04-07 PROCEDURE — 80076 HEPATIC FUNCTION PANEL: CPT

## 2023-04-07 PROCEDURE — G0378 HOSPITAL OBSERVATION PER HR: HCPCS

## 2023-04-07 PROCEDURE — 80074 ACUTE HEPATITIS PANEL: CPT

## 2023-04-07 PROCEDURE — 36415 COLL VENOUS BLD VENIPUNCTURE: CPT

## 2023-04-07 PROCEDURE — 86015 ACTIN ANTIBODY EACH: CPT

## 2023-04-07 PROCEDURE — 86038 ANTINUCLEAR ANTIBODIES: CPT

## 2023-04-07 PROCEDURE — 82977 ASSAY OF GGT: CPT

## 2023-04-07 PROCEDURE — 82390 ASSAY OF CERULOPLASMIN: CPT

## 2023-04-07 PROCEDURE — 80048 BASIC METABOLIC PNL TOTAL CA: CPT

## 2023-04-07 PROCEDURE — 87507 IADNA-DNA/RNA PROBE TQ 12-25: CPT | Performed by: PHYSICIAN ASSISTANT

## 2023-04-07 PROCEDURE — 76705 ECHO EXAM OF ABDOMEN: CPT

## 2023-04-07 PROCEDURE — 99214 OFFICE O/P EST MOD 30 MIN: CPT

## 2023-04-07 PROCEDURE — 83735 ASSAY OF MAGNESIUM: CPT | Performed by: INTERNAL MEDICINE

## 2023-04-07 PROCEDURE — 86381 MITOCHONDRIAL ANTIBODY EACH: CPT

## 2023-04-07 PROCEDURE — 82103 ALPHA-1-ANTITRYPSIN TOTAL: CPT

## 2023-04-07 PROCEDURE — 85025 COMPLETE CBC W/AUTO DIFF WBC: CPT

## 2023-04-07 PROCEDURE — 82784 ASSAY IGA/IGD/IGG/IGM EACH: CPT

## 2023-04-07 PROCEDURE — 82728 ASSAY OF FERRITIN: CPT

## 2023-04-07 RX ORDER — POTASSIUM CHLORIDE 7.45 MG/ML
10 INJECTION INTRAVENOUS
Status: DISCONTINUED | OUTPATIENT
Start: 2023-04-07 | End: 2023-04-07 | Stop reason: HOSPADM

## 2023-04-07 RX ORDER — MAGNESIUM SULFATE HEPTAHYDRATE 40 MG/ML
4 INJECTION, SOLUTION INTRAVENOUS AS NEEDED
Status: DISCONTINUED | OUTPATIENT
Start: 2023-04-07 | End: 2023-04-07 | Stop reason: HOSPADM

## 2023-04-07 RX ORDER — POTASSIUM CHLORIDE 1.5 G/1.77G
40 POWDER, FOR SOLUTION ORAL AS NEEDED
Status: DISCONTINUED | OUTPATIENT
Start: 2023-04-07 | End: 2023-04-07 | Stop reason: HOSPADM

## 2023-04-07 RX ORDER — MAGNESIUM SULFATE HEPTAHYDRATE 40 MG/ML
2 INJECTION, SOLUTION INTRAVENOUS AS NEEDED
Status: DISCONTINUED | OUTPATIENT
Start: 2023-04-07 | End: 2023-04-07 | Stop reason: HOSPADM

## 2023-04-07 RX ORDER — PANTOPRAZOLE SODIUM 40 MG/1
40 TABLET, DELAYED RELEASE ORAL
Status: DISCONTINUED | OUTPATIENT
Start: 2023-04-08 | End: 2023-04-07 | Stop reason: HOSPADM

## 2023-04-07 RX ORDER — PANTOPRAZOLE SODIUM 40 MG/1
40 TABLET, DELAYED RELEASE ORAL
Qty: 30 TABLET | Refills: 2 | Status: SHIPPED | OUTPATIENT
Start: 2023-04-08 | End: 2023-07-07

## 2023-04-07 RX ORDER — NICOTINE 21 MG/24HR
1 PATCH, TRANSDERMAL 24 HOURS TRANSDERMAL NIGHTLY
Qty: 70 PATCH | Refills: 0 | Status: SHIPPED | OUTPATIENT
Start: 2023-04-07 | End: 2023-06-16

## 2023-04-07 RX ORDER — POTASSIUM CHLORIDE 20 MEQ/1
40 TABLET, EXTENDED RELEASE ORAL AS NEEDED
Status: DISCONTINUED | OUTPATIENT
Start: 2023-04-07 | End: 2023-04-07 | Stop reason: HOSPADM

## 2023-04-07 RX ORDER — PROCHLORPERAZINE EDISYLATE 5 MG/ML
5 INJECTION INTRAMUSCULAR; INTRAVENOUS EVERY 6 HOURS PRN
Status: DISCONTINUED | OUTPATIENT
Start: 2023-04-07 | End: 2023-04-07 | Stop reason: HOSPADM

## 2023-04-07 RX ADMIN — NICOTINE 1 PATCH: 21 PATCH, EXTENDED RELEASE TRANSDERMAL at 17:35

## 2023-04-07 RX ADMIN — POTASSIUM CHLORIDE 40 MEQ: 1500 TABLET, EXTENDED RELEASE ORAL at 00:55

## 2023-04-07 RX ADMIN — NICOTINE 1 PATCH: 21 PATCH, EXTENDED RELEASE TRANSDERMAL at 00:08

## 2023-04-07 RX ADMIN — POTASSIUM CHLORIDE 40 MEQ: 1500 TABLET, EXTENDED RELEASE ORAL at 05:24

## 2023-04-07 RX ADMIN — SODIUM CHLORIDE 100 ML/HR: 9 INJECTION, SOLUTION INTRAVENOUS at 00:08

## 2023-04-07 RX ADMIN — Medication 10 ML: at 09:29

## 2023-04-07 RX ADMIN — Medication 10 ML: at 00:08

## 2023-04-07 NOTE — ED NOTES
Nursing report ED to floor  Wil Chapa  27 y.o.  male    HPI:   Chief Complaint   Patient presents with    Vomiting       Admitting doctor:   Suleiman Reis MD    Admitting diagnosis:   The primary encounter diagnosis was Persistent vomiting. Diagnoses of Acute alcoholic gastritis without hemorrhage and Hepatitis were also pertinent to this visit.    Code status:   Current Code Status       Date Active Code Status Order ID Comments User Context       Not on file            Allergies:   Patient has no known allergies.    Isolation:  No active isolations     Fall Risk:  Fall Risk Assessment was completed, and patient is at low risk for falls.   Predictive Model Details         2 (Low) Factor Value    Calculated 4/6/2023 20:29 Musculoskeletal Assessment WDL    Risk of Fall Model Active Peripheral IV Present     Imaging order in this encounter Present     Age 27     Respiratory Rate 18     Skin Assessment WDL     Magnesium not on file     Chloride 84 mmol/L     Number of Distinct Medication Classes administered 3     Total Bilirubin 2.1 mg/dL     Tobacco Use Current     Angelo Scale not on file     Financial Class Private Insurance     Peripheral Vascular Assessment WDL      U/L     Clinically Relevant Sex Not Female     Gastrointestinal Assessment WDL     Cardiac Assessment WDL     Diastolic BP 99     Calcium 9.5 mg/dL     Days after Admission 0.057     Creatinine 0.91 mg/dL     Albumin 5.4 g/dL     Potassium 3.2 mmol/L         Weight:       04/06/23  1906   Weight: 73.9 kg (162 lb 14.7 oz)       Intake and Output  No intake or output data in the 24 hours ending 04/06/23 2201    Diet:        Most recent vitals:   Vitals:    04/06/23 2046 04/06/23 2101 04/06/23 2116 04/06/23 2132   BP: 134/93 137/91 137/90 140/87   BP Location:       Patient Position:       Pulse: 74 69 73 82   Resp:       Temp:       TempSrc:       SpO2: 94% 94% 94% 96%   Weight:       Height:           Active LDAs/IV Access:   Lines, Drains &  Airways       Active LDAs       Name Placement date Placement time Site Days    Peripheral IV 04/06/23 1942 Left Antecubital 04/06/23 1942  Antecubital  less than 1                    Skin Condition:   Skin Assessments (last day)       None             Labs (abnormal labs have a star):   Labs Reviewed   COMPREHENSIVE METABOLIC PANEL - Abnormal; Notable for the following components:       Result Value    Glucose 132 (*)     Potassium 3.2 (*)     Chloride 84 (*)     CO2 35.0 (*)     Albumin 5.4 (*)     ALT (SGPT) 139 (*)     AST (SGOT) 287 (*)     Alkaline Phosphatase 142 (*)     Total Bilirubin 2.1 (*)     Anion Gap 17.0 (*)     All other components within normal limits    Narrative:     GFR Normal >60  Chronic Kidney Disease <60  Kidney Failure <15     URINALYSIS W/ MICROSCOPIC IF INDICATED (NO CULTURE) - Abnormal; Notable for the following components:    Color, UA Orange (*)     pH, UA 8.5 (*)     Specific Gravity, UA 1.086 (*)     Protein, UA 30 mg/dL (1+) (*)     All other components within normal limits   CBC WITH AUTO DIFFERENTIAL - Abnormal; Notable for the following components:    MCH 33.4 (*)     Platelets 110 (*)     Neutrophil % 77.8 (*)     Lymphocyte % 8.6 (*)     Monocyte % 13.0 (*)     Eosinophil % 0.1 (*)     Lymphocytes, Absolute 0.50 (*)     All other components within normal limits   URINALYSIS, MICROSCOPIC ONLY - Abnormal; Notable for the following components:    RBC, UA 0-2 (*)     WBC, UA 0-2 (*)     All other components within normal limits   LIPASE - Normal   GASTROINTESTINAL PANEL, PCR   URINE DRUG SCREEN   ETHANOL   CBC AND DIFFERENTIAL    Narrative:     The following orders were created for panel order CBC & Differential.  Procedure                               Abnormality         Status                     ---------                               -----------         ------                     CBC Auto Differential[[id number]]        Abnormal            Final result                 Please  view results for these tests on the individual orders.       LOC: Person, Place, Time, and Situation    Telemetry:  Telemetry    Cardiac Monitoring Ordered: no    EKG:   No orders to display       Medications Given in the ED:   Medications   sodium chloride 0.9 % flush 10 mL (has no administration in time range)   ondansetron (ZOFRAN) injection 4 mg (4 mg Intravenous Given 4/6/23 1942)   sodium chloride 0.9 % bolus 1,000 mL (1,000 mL Intravenous New Bag 4/6/23 1942)   iopamidol (ISOVUE-370) 76 % injection 100 mL (100 mL Intravenous Given 4/6/23 2023)   famotidine (PEPCID) injection 20 mg (20 mg Intravenous Given 4/6/23 2043)   ondansetron (ZOFRAN) injection 4 mg (4 mg Intravenous Given 4/6/23 2043)       Imaging results:  CT Abdomen Pelvis With Contrast    Result Date: 4/6/2023  Impression: 1.Hepatic steatosis Electronically Signed: Isaiah Middleton  4/6/2023 8:42 PM EDT  Workstation ID: [id number]     Social issues:   Social History     Socioeconomic History    Marital status: Single   Tobacco Use    Smoking status: Every Day     Packs/day: 0.50     Types: Cigarettes    Smokeless tobacco: Never   Substance and Sexual Activity    Alcohol use: Yes     Alcohol/week: 16.0 standard drinks     Types: 16 Shots of liquor per week    Drug use: Yes     Types: Marijuana       NIH Stroke Scale:  Interval: (not recorded)  1a. Level of Consciousness: (not recorded)  1b. LOC Questions: (not recorded)  1c. LOC Commands: (not recorded)  2. Best Gaze: (not recorded)  3. Visual: (not recorded)  4. Facial Palsy: (not recorded)  5a. Motor Arm, Left: (not recorded)  5b. Motor Arm, Right: (not recorded)  6a. Motor Leg, Left: (not recorded)  6b. Motor Leg, Right: (not recorded)  7. Limb Ataxia: (not recorded)  8. Sensory: (not recorded)  9. Best Language: (not recorded)  10. Dysarthria: (not recorded)  11. Extinction and Inattention (formerly Neglect): (not recorded)    Total (NIH Stroke Scale): (not recorded)     Additional notable assessment  information:NA     Nursing report ED to floor:  FERDINAND Baldwin RN   04/06/23 22:01 EDT

## 2023-04-07 NOTE — DISCHARGE INSTRUCTIONS
Patient was advised to follow-up with his a primary care physician who will review his current medications.    Patient was advised to follow-up with his a gastroenterologist who will reassess his GI function and the liver disease.    Patient was advised to return to the emergency department if he experiences any recurrence of his symptoms.

## 2023-04-07 NOTE — PROGRESS NOTES
[facility] Medicine Services Daily Progress Note    Patient Name: Wil Chapa  : 1995  MRN: [id number]  Primary Care Physician:  Provider, No Known  Date of admission: 2023      Subjective      Chief Complaint: Abdominal pain and persistent vomiting.      Patient Reports:        2023.  Patient was seen and examined.  Patient reported slight improvement in his symptoms.    Review of Systems   Constitutional: Negative.   HENT: Negative.    Eyes: Negative.    Cardiovascular: Negative.    Respiratory: Negative.    Endocrine: Negative.    Hematologic/Lymphatic: Negative.    Skin: Negative.    Musculoskeletal: Negative.    Gastrointestinal: Positive for abdominal pain, nausea and vomiting.   Genitourinary: Negative.    Neurological: Negative.    Psychiatric/Behavioral: Negative.    Allergic/Immunologic: Negative.             Objective      Vitals:   Temp:  [97.7 °F (36.5 °C)-98.3 °F (36.8 °C)] 98.3 °F (36.8 °C)  Heart Rate:  [69-91] 88  Resp:  [18] 18  BP: (129-148)/() 143/71    Physical Exam  Vitals reviewed.   Constitutional:       General: He is not in acute distress.  HENT:      Head: Normocephalic.      Nose: Nose normal.      Mouth/Throat:      Mouth: Mucous membranes are dry.      Pharynx: Oropharynx is clear.   Eyes:      Extraocular Movements: Extraocular movements intact.      Conjunctiva/sclera: Conjunctivae normal.      Pupils: Pupils are equal, round, and reactive to light.   Cardiovascular:      Pulses: Normal pulses.      Heart sounds: No murmur heard.    No friction rub. No gallop.      Comments: S1 and S2 present.  No tachycardia.  Pulmonary:      Effort: Pulmonary effort is normal.      Breath sounds: No stridor. No wheezing or rales.   Chest:      Chest wall: No tenderness.   Abdominal:      General: Bowel sounds are normal. There is no distension.      Palpations: Abdomen is soft.      Tenderness: There is no abdominal tenderness. There is no right CVA  tenderness or guarding.   Musculoskeletal:         General: No swelling, tenderness, deformity or signs of injury.      Cervical back: Normal range of motion. No rigidity.      Right lower leg: No edema.      Left lower leg: No edema.   Skin:     General: Skin is warm and dry.      Capillary Refill: Capillary refill takes less than 2 seconds.      Coloration: Skin is not jaundiced.      Findings: No bruising, erythema, lesion or rash.   Neurological:      Comments: No facial asymmetry noted.  Gait and station not tested.   Psychiatric:      Comments: No agitation.               Result Review    Result Review:  I have personally reviewed the results from the time of this admission to 4/7/2023 10:43 EDT and agree with these findings:  []  Laboratory  []  Microbiology  []  Radiology  []  EKG/Telemetry   []  Cardiology/Vascular   []  Pathology  []  Old records  []  Other:  Most notable findings include:           Assessment & Plan    From previous notes and with minor updates.      Brief Patient Summary:      Patient is a 27 y.o. male with previous medical history of daily alcohol abuse, tobacco abuse, marijuana use, and seizures with alcohol withdrawal who presented to [facility] on 4/6/2023 complaining of abdominal pain and persistent vomiting and nausea that began on 4/5/2023.  He denies hematemesis or melena.  Patient was seen in the emergency room and in the ED, CT of the abdomen and pelvis with contrast shows hepatic steatosis.  Ethanol is less than 0.01%, lipase 18, alkaline phosphatase 142, , , total bilirubin 2.1, potassium 3.2.  Otherwise, labs are unremarkable.  Urine tox screen was positive for THC.  He is afebrile, all vital signs are stable.  He received IV fluids, Zofran, and Pepcid in the ED.  Hospitalist was consulted for further care and management      nicotine, 1 patch, Transdermal, Nightly  sodium chloride, 10 mL, Intravenous, Q12H       sodium chloride, 125 mL/hr, Last Rate:  125 mL/hr (04/07/23 0743)         Active Hospital Problems:  Active Hospital Problems    Diagnosis    • **Cannabis hyperemesis syndrome concurrent with and due to cannabis abuse    • Cannabis intoxication with complication    • Generalized abdominal pain    • Persistent vomiting    • Acute hypokalemia    • Alcohol abuse    • Marijuana abuse    • Elevated liver enzymes    • Tobacco use disorder          Plan:      -Continue appropriate patient's home medications for other chronic medical conditions.  -Continue the present level of care.  -Patient and family agreed with the plan of care.  -Treated nausea and vomiting with Zofran and Compazine.  -Complete marijuana cessation counseling.  -Treat alcohol abuse/withdrawal with a CIWA protocol.  -Complete alcohol cessation counseling.  -Treated hypokalemia with potassium replacement.  -Completed GI consulted because of elevated liver enzymes.      DVT prophylaxis:  Mechanical DVT prophylaxis orders are present.    CODE STATUS:    Code Status (Patient has no pulse and is not breathing): CPR (Attempt to Resuscitate)  Medical Interventions (Patient has pulse or is breathing): Full Support      Disposition: This wonderful patient can discharge in the next 24 to 48 hours..    This patient has been examined wearing appropriate Personal Protective Equipment and discussed with hospital infection control department, [facility], infectious disease specialist and pulmonologist. 04/07/23      Electronically signed by Adán Bustamante MD, FACP, 04/07/23, 10:43 EDT.      [facility] Hospitalist Team

## 2023-04-07 NOTE — PLAN OF CARE
Goal Outcome Evaluation:  Plan of Care Reviewed With: patient        Progress: no change  Outcome Evaluation: Pt rested well since on the floor. Continous NS runs at 100 ml/hr. Vital are stable. Started Seizure precaution and CIWAR screening. Pt complained about hunger, soup offered. PT had no N/V episode admitted to the floor . Will continue to monitor

## 2023-04-07 NOTE — DISCHARGE SUMMARY
[facility] Medicine Services  DISCHARGE SUMMARY    Patient Name: Wil Chapa  : 1995  MRN: [id number]    Date of Admission: 2023  Discharge Diagnosis: Cannabis hyperemesis syndrome./Alcohol use disorder.  Date of Discharge: 2023.  Primary Care Physician: Angela Holden PA      Presenting Problem:   Persistent vomiting [R11.15]  Hepatitis [K75.9]  Acute alcoholic gastritis without hemorrhage [K29.20]    Active and Resolved Hospital Problems:  Active Hospital Problems    Diagnosis POA   • **Cannabis hyperemesis syndrome concurrent with and due to cannabis abuse [F12.188] Yes     Priority: High   • Cannabis intoxication with complication [F12.929] Yes     Priority: High   • Generalized abdominal pain [R10.84] Yes     Priority: High   • Acute alcoholic gastritis without hemorrhage [K29.20] Yes     Priority: High   • Persistent vomiting [R11.15] Yes     Priority: High   • Acute hypokalemia [E87.6] Yes     Priority: Low   • Alcohol abuse [F10.10] Yes   • Marijuana abuse [F12.10] Yes   • Elevated liver enzymes [R74.8] Yes   • Tobacco use disorder [F17.200] Yes      Resolved Hospital Problems   No resolved problems to display.         Hospital Course   From previous notes and with minor updates.      Hospital Course:         Patient is a 27 y.o. male with previous medical history of daily alcohol abuse, tobacco abuse, marijuana use, and seizures with alcohol withdrawal who presented to [facility] on 2023 complaining of abdominal pain and persistent vomiting and nausea that began on 2023.  He denies hematemesis or melena.  Patient was seen in the emergency room and in the ED, CT of the abdomen and pelvis with contrast shows hepatic steatosis.  Ethanol is less than 0.01%, lipase 18, alkaline phosphatase 142, , , total bilirubin 2.1, potassium 3.2.  Otherwise, labs are unremarkable.  Urine tox screen was positive for THC.  He is afebrile, all  vital signs are stable.  He received IV fluids, Zofran, and Pepcid in the ED.  Hospitalist was consulted for further care and management.  Cannabis hyperemesis syndrome was treated with Zofran and Compazine.  GI consult was completed.  Marijuana cessation counseling was completed.  Alcohol intoxication/withdrawal was treated with CIWA protocol.  Alcohol cessation counseling was completed.  Tobacco use disorder was treated with nicotine patch.  Tobacco cessation counseling was completed.  Appropriate patient's home medications were resumed in the hospital for other chronic medical conditions.  Patient reported complete resolution of his symptoms after over 24 hours in the hospital and requested to be discharged home.  Patient was advised to take his medications as prescribed.  Discharge medications are as per medication reconciliation list.  Patient was advised to follow-up with his primary care physician within 3 to 5 days of discharge.  Patient was advised to follow-up with a gastroenterologist within 7 days of discharge.  Patient was advised to return to the emergency department if he experiences any recurrence of his symptoms.  Patient and family agreed with the plan and patient was discharged in a stable condition.        DISCHARGE Follow Up Recommendations for labs and diagnostics:     Patient was advised to follow-up with his a primary care physician who will review his current medications.     Patient was advised to follow-up with his a gastroenterologist who will reassess his GI function and the liver disease        Reasons For Change In Medications and Indications for New Medications:      Day of Discharge     Vital Signs:  Temp:  [97.7 °F (36.5 °C)-98.3 °F (36.8 °C)] 98.3 °F (36.8 °C)  Heart Rate:  [69-91] 74  Resp:  [18] 18  BP: (127-148)/() 127/93    Physical Exam:  Physical Exam  Vitals reviewed.   Constitutional:       General: He is not in acute distress.  HENT:      Head: Normocephalic.       Nose: Nose normal.      Mouth/Throat:      Mouth: Mucous membranes are dry.      Pharynx: Oropharynx is clear.   Eyes:      Extraocular Movements: Extraocular movements intact.      Conjunctiva/sclera: Conjunctivae normal.      Pupils: Pupils are equal, round, and reactive to light.   Cardiovascular:      Pulses: Normal pulses.      Heart sounds: No murmur heard.    No friction rub. No gallop.      Comments: S1 and S2 present.  No tachycardia.  Pulmonary:      Effort: Pulmonary effort is normal.      Breath sounds: No stridor. No wheezing or rales.   Chest:      Chest wall: No tenderness.   Abdominal:      General: Bowel sounds are normal. There is no distension.      Palpations: Abdomen is soft.      Tenderness: There is no abdominal tenderness. There is no right CVA tenderness or guarding.   Musculoskeletal:         General: No swelling, tenderness, deformity or signs of injury.      Cervical back: Normal range of motion. No rigidity.      Right lower leg: No edema.      Left lower leg: No edema.   Skin:     General: Skin is warm and dry.      Capillary Refill: Capillary refill takes less than 2 seconds.      Coloration: Skin is not jaundiced.      Findings: No bruising, erythema, lesion or rash.   Neurological:      Comments: No facial asymmetry noted.  Gait and station not tested.   Psychiatric:      Comments: No agitation.              Pertinent  and/or Most Recent Results     LAB RESULTS:      Lab 04/07/23  0947 04/06/23 1945   WBC 5.30 6.00   HEMOGLOBIN 14.6 16.8   HEMATOCRIT 42.0 47.3   PLATELETS 89* 110*   NEUTROS ABS 3.60 4.60   LYMPHS ABS 1.10 0.50*   MONOS ABS 0.50 0.80   EOS ABS 0.10 0.00   MCV 96.0 93.9         Lab 04/07/23  0947 04/06/23 1945   SODIUM 138 136   POTASSIUM 3.5 3.2*   CHLORIDE 93* 84*   CO2 31.0* 35.0*   ANION GAP 14.0 17.0*   BUN 7 8   CREATININE 0.92 0.91   EGFR 115.5 117.0   GLUCOSE 83 132*   CALCIUM 8.5* 9.5   MAGNESIUM 1.1*  --          Lab 04/07/23  1652 04/06/23 1945   TOTAL  PROTEIN 7.3 8.2   ALBUMIN 4.7 5.4*   GLOBULIN  --  2.8   ALT (SGPT) 117* 139*   AST (SGOT) 271* 287*   BILIRUBIN 2.0* 2.1*   INDIRECT BILIRUBIN 1.3  --    BILIRUBIN DIRECT 0.7*  --    ALK PHOS 121* 142*   LIPASE  --  18                 Lab 04/07/23  0947   IRON 191*   IRON SATURATION 72*   TIBC 267*   TRANSFERRIN 179*         Brief Urine Lab Results  (Last result in the past 365 days)      Color   Clarity   Blood   Leuk Est   Nitrite   Protein   CREAT   Urine HCG        04/06/23 2138 Schuyler  Comment: Any Substance that causes an abnormal urine color can alter the accuracy of the chemical reactions.   Clear   Negative   Negative   Negative   30 mg/dL (1+)               Microbiology Results (last 10 days)     Procedure Component Value - Date/Time    Gastrointestinal Panel, PCR - Stool, Per Rectum [[id number]]  (Normal) Collected: 04/07/23 0801    Lab Status: Final result Specimen: Stool from Per Rectum Updated: 04/07/23 0921     Campylobacter Not Detected     Plesiomonas shigelloides Not Detected     Salmonella Not Detected     Vibrio Not Detected     Vibrio cholerae Not Detected     Yersinia enterocolitica Not Detected     Enteroaggregative E. coli (EAEC) Not Detected     Enteropathogenic E. coli (EPEC) Not Detected     Enterotoxigenic E. coli (ETEC) lt/st Not Detected     Shiga-like toxin-producing E. coli (STEC) stx1/stx2 Not Detected     Shigella/Enteroinvasive E. coli (EIEC) Not Detected     Cryptosporidium Not Detected     Cyclospora cayetanensis Not Detected     Entamoeba histolytica Not Detected     Giardia lamblia Not Detected     Adenovirus F40/41 Not Detected     Astrovirus Not Detected     Norovirus GI/GII Not Detected     Rotavirus A Not Detected     Sapovirus (I, II, IV or V) Not Detected    Narrative:      If Aeromonas, Staphylococcus aureus or Bacillus cereus are suspected, please order [id number]: Stool Culture, Aeromonas, S aureus, B Cereus.          CT Abdomen Pelvis With Contrast    Result Date:  4/6/2023  Impression: Impression: 1.Hepatic steatosis Electronically Signed: Isaiah Middleton  4/6/2023 8:42 PM EDT  Workstation ID: [id number]                  Labs Pending at Discharge:  Pending Labs     Order Current Status    LIAM In process    Alpha - 1 - Antitrypsin In process    Anti-Smooth Muscle Antibody Titer In process    Ceruloplasmin In process    Ferritin In process    Gamma GT In process    IgG, IgA, IgM In process    Mitochondrial Antibodies, M2 In process          Procedures Performed           Consults:   Consults     Date and Time Order Name Status Description    4/7/2023  7:36 AM Inpatient Gastroenterology Consult Completed     4/6/2023  9:33 PM Hospitalist (on-call MD unless specified)              Discharge Details        Discharge Medications      New Medications      Instructions Start Date   nicotine 21 MG/24HR patch  Commonly known as: NICODERM CQ   1 patch, Transdermal, Nightly      pantoprazole 40 MG EC tablet  Commonly known as: PROTONIX   40 mg, Oral, Every Early Morning   Start Date: April 8, 2023        Continue These Medications      Instructions Start Date   naltrexone 50 MG tablet  Commonly known as: DEPADE   50 mg, Oral, Daily      ondansetron ODT 4 MG disintegrating tablet  Commonly known as: ZOFRAN-ODT   4 mg, Translingual, Every 6 Hours PRN      sertraline 100 MG tablet  Commonly known as: ZOLOFT   100 mg, Oral, Daily      traZODone 100 MG tablet  Commonly known as: DESYREL   100 mg, Oral, Nightly             No Known Allergies      Discharge Disposition: Stable.  Home or Self Care    Diet:  Hospital:  Diet Order   Procedures   • NPO Diet NPO Type: Strict NPO         Discharge Activity: As tolerated.        CODE STATUS:  Code Status and Medical Interventions:   Ordered at: 04/06/23 2325     Code Status (Patient has no pulse and is not breathing):    CPR (Attempt to Resuscitate)     Medical Interventions (Patient has pulse or is breathing):    Full Support         No future  appointments.        Time spent on Discharge including face to face service: 55 minutes    This patient has been examined wearing appropriate Personal Protective Equipment and discussed with hospital infection control department, [facility], infectious disease specialist and pulmonologist. 04/07/23      Signature:Electronically signed by Adán Bustamante MD, FACP, 04/07/23, 6:00 PM EDT.

## 2023-04-07 NOTE — PLAN OF CARE
Problem: Adult Inpatient Plan of Care  Goal: Absence of Hospital-Acquired Illness or Injury  Intervention: Identify and Manage Fall Risk  Recent Flowsheet Documentation  Taken 4/7/2023 1615 by Buffy Marley RN  Safety Promotion/Fall Prevention:   assistive device/personal items within reach   clutter free environment maintained   fall prevention program maintained   lighting adjusted   nonskid shoes/slippers when out of bed   room organization consistent   safety round/check completed  Taken 4/7/2023 1433 by Buffy Marley RN  Safety Promotion/Fall Prevention:   assistive device/personal items within reach   clutter free environment maintained   fall prevention program maintained   lighting adjusted   nonskid shoes/slippers when out of bed   room organization consistent   safety round/check completed  Taken 4/7/2023 1240 by Buffy Marley RN  Safety Promotion/Fall Prevention:   assistive device/personal items within reach   clutter free environment maintained   fall prevention program maintained   lighting adjusted   nonskid shoes/slippers when out of bed   room organization consistent   safety round/check completed  Taken 4/7/2023 1035 by Buffy Marley RN  Safety Promotion/Fall Prevention:   assistive device/personal items within reach   fall prevention program maintained   lighting adjusted   nonskid shoes/slippers when out of bed   room organization consistent   safety round/check completed  Taken 4/7/2023 0805 by Buffy Marley RN  Safety Promotion/Fall Prevention:   assistive device/personal items within reach   clutter free environment maintained   fall prevention program maintained   lighting adjusted   nonskid shoes/slippers when out of bed   room organization consistent   safety round/check completed  Intervention: Prevent Skin Injury  Recent Flowsheet Documentation  Taken 4/7/2023 1615 by Buffy Marley RN  Body Position: position changed independently  Taken 4/7/2023 1433 by Buffy Marley RN  Body Position:  position changed independently  Taken 4/7/2023 1240 by Buffy Marley RN  Body Position: position changed independently  Taken 4/7/2023 1035 by Buffy Marley RN  Body Position: position changed independently  Taken 4/7/2023 0805 by Buffy Marley RN  Body Position: position changed independently  Intervention: Prevent and Manage VTE (Venous Thromboembolism) Risk  Recent Flowsheet Documentation  Taken 4/7/2023 1615 by Buffy Marley RN  Activity Management: up ad moira  Taken 4/7/2023 1433 by Buffy Marley RN  Activity Management: up ad moira  Taken 4/7/2023 1240 by Buffy Marley RN  Activity Management: up ad moira  Taken 4/7/2023 1035 by Buffy Marley RN  Activity Management: up ad moira  Taken 4/7/2023 0805 by Buffy Marley RN  Activity Management: up ad moira  Intervention: Prevent Infection  Recent Flowsheet Documentation  Taken 4/7/2023 1615 by Buffy Marley RN  Infection Prevention:   hand hygiene promoted   personal protective equipment utilized  Taken 4/7/2023 1433 by Buffy Marley RN  Infection Prevention:   hand hygiene promoted   personal protective equipment utilized  Taken 4/7/2023 1240 by Buffy Marley RN  Infection Prevention:   hand hygiene promoted   personal protective equipment utilized  Taken 4/7/2023 1035 by Buffy Marley RN  Infection Prevention:   hand hygiene promoted   personal protective equipment utilized  Taken 4/7/2023 0805 by Buffy Marley RN  Infection Prevention:   hand hygiene promoted   personal protective equipment utilized  Goal: Optimal Comfort and Wellbeing  Intervention: Provide Person-Centered Care  Recent Flowsheet Documentation  Taken 4/7/2023 0805 by Buffy Marley RN  Trust Relationship/Rapport: care explained     Problem: Nausea and Vomiting  Goal: Fluid and Electrolyte Balance  Intervention: Prevent and Manage Nausea and Vomiting  Recent Flowsheet Documentation  Taken 4/7/2023 0805 by Buffy Marley RN  Environmental Support: calm environment promoted   Goal Outcome Evaluation:          Patient to have Abd US this evening. Possible DC after. No complaints this shift.

## 2023-04-07 NOTE — CASE MANAGEMENT/SOCIAL WORK
Social Work Assessment  [facility]     Patient Name: Wil Chapa  MRN: [id number]  Today's Date: 4/7/2023    Admit Date: 4/6/2023     Substance Abuse     Row Name 04/07/23 1625       Substance Use    Substance Use Status current alcohol use;current street drug/inhalant/medication abuse       Family Member Substance Use (#4)    Substance Use Comments SW met with pt at bedside to follow up on marijuana use and alcohol use.  Pt reported he sees a therapist at [facility] and is not interested in any further treatment to address those concerns.  Pt was agreeable to the resource list.  SW left a list of treatment facilities with pt at bedside. No further needs identified.              Met with patient in room wearing PPE: mask.  Maintained distance greater than six feet and spent less than 15 minutes in the room.    Luba Dinh LCSW    Office: [phone]  Fax: [phone]  [email or web address]

## 2023-04-07 NOTE — NURSING NOTE
GI consult placed. No N/V or complaints of pain noted. Patient tolerated breakfast with no complaints. CIWA score of 2 at this time. No meds administered. Will continue to monitor.

## 2023-04-07 NOTE — H&P
[facility] Medicine Services  History & Physical    Patient Name: Wil Chapa  : 1995  MRN: [id number]  Primary Care Physician:  Provider, No Known  Date of admission: 2023  Date and Time of Service: 2023 at 2300    Subjective      Chief Complaint: Abdominal pain and persistent vomiting    History of Present Illness: Wil Chapa is a 27 y.o. male with previous medical history of daily alcohol abuse, tobacco abuse, marijuana use, and seizures with alcohol withdrawal who presented to [facility] on 2023 complaining of abdominal pain and persistent vomiting and nausea that began on 2023.  He denies hematemesis or melena.    In the ED, CT of the abdomen and pelvis with contrast shows hepatic steatosis.  Ethanol is less than 0.01%, lipase 18, alkaline phosphatase 142, , , total bilirubin 2.1, potassium 3.2.  Otherwise, labs are unremarkable.  Urine tox screen was positive for THC.  He is afebrile, all vital signs are stable.  He received IV fluids, Zofran, and Pepcid in the ED.  Hospitalist was consulted for further care and management.    12 point ROS reviewed and negative except as mentioned above    Personal History     Past Medical History:   Diagnosis Date   • Seizures     as a child and withdrawal related       Past Surgical History:   Procedure Laterality Date   • MYRINGOTOMY W/ TUBES     • PILONIDAL CYSTECTOMY         Family History: family history is not on file. Otherwise pertinent FHx was reviewed and not pertinent to current issue.    Social History:  reports that he has been smoking cigarettes. He has been smoking an average of .5 packs per day. He has never used smokeless tobacco. He reports current alcohol use of about 16.0 standard drinks per week. He reports current drug use. Drug: Marijuana.    Home Medications:  Prior to Admission Medications     Prescriptions Last Dose Informant Patient Reported? Taking?    ondansetron ODT (ZOFRAN-ODT) 4 MG  disintegrating tablet   No No    Place 1 tablet on the tongue Every 6 (Six) Hours As Needed for Vomiting or Nausea.            Allergies:  No Known Allergies    Objective      Vitals:   Temp:  [97.7 °F (36.5 °C)] 97.7 °F (36.5 °C)  Heart Rate:  [69-90] 82  Resp:  [18] 18  BP: (129-148)/() 140/87    Physical Exam  Vitals and nursing note reviewed.   Constitutional:       General: He is sleeping.      Appearance: Normal appearance.   HENT:      Head: Normocephalic and atraumatic.      Nose: Nose normal.      Mouth/Throat:      Mouth: Mucous membranes are moist.   Eyes:      Extraocular Movements: Extraocular movements intact.      Pupils: Pupils are equal, round, and reactive to light.   Cardiovascular:      Rate and Rhythm: Normal rate and regular rhythm.      Pulses: Normal pulses.      Heart sounds: Normal heart sounds.   Pulmonary:      Effort: Pulmonary effort is normal.      Breath sounds: Normal breath sounds.   Abdominal:      General: Bowel sounds are normal.      Palpations: Abdomen is soft.      Tenderness: There is generalized abdominal tenderness.   Musculoskeletal:         General: Normal range of motion.      Cervical back: Normal range of motion.   Skin:     General: Skin is warm and dry.   Neurological:      General: No focal deficit present.      Mental Status: He is oriented to person, place, and time and easily aroused. Mental status is at baseline.   Psychiatric:         Mood and Affect: Mood normal.         Behavior: Behavior normal.         Result Review    Result Review:  I have personally reviewed the results from the time of this admission to 4/6/2023 23:35 EDT and agree with these findings:  [x]  Laboratory  []  Microbiology  [x]  Radiology  []  EKG/Telemetry   []  Cardiology/Vascular   []  Pathology  []  Old records  []  Other:  Most notable findings include: as above    Assessment & Plan        Active Hospital Problems:  Active Hospital Problems    Diagnosis    • **Persistent  vomiting    • Alcohol abuse    • Marijuana abuse    • Elevated liver enzymes    • Tobacco dependence      Plan:     Persistent vomiting   Elevated liver enzymes  Alcohol abuse  -CT of the abdomen and pelvis reviewed, hepatic steatosis present  -EtOH less than 0.01%  -Reports daily drinking  -Alkaline phosphatase 142 (131 in November 2022)   - (45 in November 2022)  - (65 in November 2022)  -Total bilirubin 2.1 (1.1 in November 2022)  -IV fluids ordered  -Antiemetics as needed  -Supportive care  -Consider GI consult due to elevated liver enzymes  -Alcohol withdrawal protocol ordered    Tobacco abuse  -Encourage cessation  -Nicotine patch ordered    DVT prophylaxis:  Mechanical DVT prophylaxis orders are present.    CODE STATUS:    Code Status (Patient has no pulse and is not breathing): CPR (Attempt to Resuscitate)  Medical Interventions (Patient has pulse or is breathing): Full Support    Admission Status:  I believe this patient meets observation status.    I discussed the patient's findings and my recommendations with patient.    This patient has been examined wearing appropriate Personal Protective Equipment  04/06/23      Signature: Electronically signed by Chary Chacko DNP, APRN, 04/06/23, 23:35 EDT.  [facility]ist Team

## 2023-04-07 NOTE — CONSULTS
GI CONSULT  NOTE:    Referring Provider: Dr. Bustamante    Chief complaint: Nausea, vomiting    History of present illness: Wil Chapa is a 27 y.o. male with history of alcohol abuse, seizures secondary to withdrawal, tobacco and marijuana use who presented with complaints of nausea and vomiting for 2-3 days.  GI has been asked to consult for elevated liver enzymes.  , , alk phos 142, total bilirubin 2.1, lipase 18.  CT of abdomen/pelvis shows hepatic steatosis.  Ethanol level less than 0.01%.  Urine tox is positive for THC.  Patient states he has been told his liver enzymes have been elevated in the past.  States about 3 years ago he had elevated liver enzymes during an admission for similar symptoms.  Unsure of any previous serologic work-up. He denies any abdominal pain.  He has intermittent nausea with vomiting.  States he last vomited yesterday.  No hematemesis.  He denies heartburn or dysphagia.  States he typically has 2 bowel movements a day.  No hematochezia or melena.  Over the past 1-2 years he has had unintentional weight loss of around 15 pounds.  He states his weight has fluctuated.  He attributes this to decreased appetite related to intermittent nausea.  His father has had elevated liver enzymes, but unsure of etiology.  Denies any other family history of liver disease.  He denies personal history of gallbladder disease.     Endo History:  No previous endoscopy.    Past Medical History:  Past Medical History:   Diagnosis Date   • Seizures     as a child and withdrawal related       Past Surgical History:  Past Surgical History:   Procedure Laterality Date   • MYRINGOTOMY W/ TUBES     • PILONIDAL CYSTECTOMY         Social History:  Social History     Tobacco Use   • Smoking status: Every Day     Packs/day: 0.50     Types: Cigarettes   • Smokeless tobacco: Never   Vaping Use   • Vaping Use: Never used   Substance Use Topics   • Alcohol use: Yes     Alcohol/week: 16.0 standard drinks      Types: 16 Shots of liquor per week   • Drug use: Yes     Types: Marijuana       Family History:  History reviewed. No pertinent family history.    Medications:  Medications Prior to Admission   Medication Sig Dispense Refill Last Dose   • naltrexone (DEPADE) 50 MG tablet Take 1 tablet by mouth Daily.      • ondansetron ODT (ZOFRAN-ODT) 4 MG disintegrating tablet Place 1 tablet on the tongue Every 6 (Six) Hours As Needed for Vomiting or Nausea. 30 tablet 0    • sertraline (ZOLOFT) 100 MG tablet Take 1 tablet by mouth Daily.      • traZODone (DESYREL) 100 MG tablet Take 1 tablet by mouth Every Night.          Scheduled Meds:nicotine, 1 patch, Transdermal, Nightly  sodium chloride, 10 mL, Intravenous, Q12H      Continuous Infusions:sodium chloride, 125 mL/hr, Last Rate: 125 mL/hr (04/07/23 0743)      PRN Meds:.•  ketorolac  •  LORazepam **OR** LORazepam **OR** LORazepam **OR** LORazepam **OR** LORazepam **OR** LORazepam  •  magnesium sulfate **OR** magnesium sulfate **OR** magnesium sulfate  •  melatonin  •  ondansetron **OR** ondansetron  •  phenol  •  potassium chloride **OR** potassium chloride **OR** potassium chloride  •  prochlorperazine  •  sodium chloride  •  sodium chloride  •  sodium chloride    ALLERGIES:  Patient has no known allergies.    ROS:  The following systems were reviewed   Constitution:  No fevers, chills, no unintentional weight loss  Skin: no rash, no jaundice  Eyes:  No blurry vision, no eye pain  HENT:  No change in hearing or smell  Resp:  No dyspnea or cough  CV:  No chest pain or palpitations  :  No dysuria, hematuria  Musculoskeletal:  No leg cramps or arthralgias  Neuro:  No tremor, no numbness  Psych:  No depression or confusion    Objective     Vital Signs:   Vitals:    04/06/23 2132 04/06/23 2334 04/07/23 0414 04/07/23 1123   BP: 140/87 137/86 143/71 127/93   BP Location:  Right arm Right arm Right arm   Patient Position:  Lying Lying Sitting   Pulse: 82 91 88 74   Resp:  18 18 18  "  Temp:  98 °F (36.7 °C) 98.3 °F (36.8 °C) 98.3 °F (36.8 °C)   TempSrc:  Oral Oral Oral   SpO2: 96% 95% 100% 100%   Weight:  75.1 kg (165 lb 9.1 oz)     Height:  182.9 cm (72\")         Physical Exam:       General Appearance:    Awake and alert, in no acute distress   Head:    Normocephalic, without obvious abnormality, atraumatic   Throat:   No oral lesions, no thrush, oral mucosa moist   Lungs:     Respirations regular, even and unlabored   Chest Wall:    No abnormalities observed   Abdomen:     Soft, generalized abdominal tenderness, no rebound or guarding, non-distended, no hepatosplenomegaly   Rectal:     Deferred   Extremities:   Moves all extremities, no edema, no cyanosis   Pulses:   Pulses palpable and equal bilaterally   Skin:   No rash, no jaundice, normal palpation               Results Review:   I reviewed the patient's labs and imaging.  CBC    Results from last 7 days   Lab Units 04/07/23  0947 04/06/23 1945   WBC 10*3/mm3 5.30 6.00   HEMOGLOBIN g/dL 14.6 16.8   PLATELETS 10*3/mm3 89* 110*     CMP   Results from last 7 days   Lab Units 04/07/23  0947 04/06/23  1945   SODIUM mmol/L 138 136   POTASSIUM mmol/L 3.5 3.2*   CHLORIDE mmol/L 93* 84*   CO2 mmol/L 31.0* 35.0*   BUN mg/dL 7 8   CREATININE mg/dL 0.92 0.91   GLUCOSE mg/dL 83 132*   ALBUMIN g/dL  --  5.4*   BILIRUBIN mg/dL  --  2.1*   ALK PHOS U/L  --  142*   AST (SGOT) U/L  --  287*   ALT (SGPT) U/L  --  139*   MAGNESIUM mg/dL 1.1*  --    LIPASE U/L  --  18     Cr Clearance Estimated Creatinine Clearance: 125.8 mL/min (by C-G formula based on SCr of 0.92 mg/dL).  Coag     HbA1C No results found for: HGBA1C  Blood Glucose   Glucose   Date/Time Value Ref Range Status   04/07/2023 0741 97 70 - 105 mg/dL Final     Comment:     Serial Number: [id number]:  [id number]     Infection     UA    Results from last 7 days   Lab Units 04/06/23  2138   NITRITE UA  Negative   WBC UA /HPF 0-2*   BACTERIA UA /HPF None Seen   SQUAM EPITHEL UA /HPF 0-2 "     Radiology(recent) CT Abdomen Pelvis With Contrast    Result Date: 4/6/2023  Impression: 1.Hepatic steatosis Electronically Signed: Isaiah Middleton  4/6/2023 8:42 PM EDT  Workstation ID: [id number]         ASSESSMENT:   27 y.o. male with history of alcohol abuse, seizures secondary to withdrawal, tobacco and marijuana use who presented with complaints of nausea and vomiting for 2-3 days.  GI has been asked to consult for elevated liver enzymes.     -Elevated liver enzymes -consider secondary to alcohol hepatitis versus other  Principal Problem:    Cannabis hyperemesis syndrome concurrent with and due to cannabis abuse  Active Problems:    Persistent vomiting    Tobacco use disorder    Alcohol abuse    Marijuana abuse    Elevated liver enzymes    Acute hypokalemia    Cannabis intoxication with complication    Generalized abdominal pain       PLAN:  , , alk phos 142, total bilirubin 2.1, lipase 18.  CT of abdomen/pelvis shows hepatic steatosis, normal gallbladder.  Ethanol level less than 0.01%.  Urine tox is positive for THC.  He is denying any abdominal pain.  He has some intermittent nausea and vomiting.  No hematemesis.    We will check RUQ US and send liver serologies.  Okay for diet as tolerated.  Recommend alcohol and marijuana cessation.  Continue CIWA protocol.  Continue nicotine patch.  Antiemetics as needed.  We will add PPI.  Supportive care.  GI will follow.    I discussed the patients findings and my recommendations with the patient.    We appreciate the referral.    Electronically signed by ANDRE Zheng, 04/07/23, 3:26 PM EDT.

## 2023-04-07 NOTE — CASE MANAGEMENT/SOCIAL WORK
Case Management Discharge Note    Transportation Services  Private: Car    Final Discharge Disposition Code: 01 - home or self-care       Discharge Planning Assessment   [facility]     Patient Name: Wil Chapa  MRN: [id number]  Today's Date: 4/7/2023    Admit Date: 4/6/2023    Plan: DC Plan: Home   Discharge Needs Assessment     Row Name 04/07/23 1804       Living Environment    Current Living Arrangements home    Provides Primary Care For no one    Quality of Family Relationships involved;helpful;supportive    Able to Return to Prior Arrangements yes       Resource/Environmental Concerns    Resource/Environmental Concerns none    Transportation Concerns none       Transition Planning    Patient/Family Anticipates Transition to home    Patient/Family Anticipated Services at Transition none    Transportation Anticipated family or friend will provide       Discharge Needs Assessment    Readmission Within the Last 30 Days no previous admission in last 30 days    Equipment Currently Used at Home none    Anticipated Changes Related to Illness none    Equipment Needed After Discharge none               Discharge Plan                      Row Name 04/07/23 1801       Plan    Plan DC Plan: Home    Patient/Family in Agreement with Plan yes    Plan Comments Met with patient at bedside.  Lives at home. IADL.  Denies discharge needs. No PCP listed.  Patient states current with Angela Holden. Added to [facility].   Roger PA today.              Continued Care and Services - Admitted Since 4/6/2023    Coordination has not been started for this encounter.       Expected Discharge Date and Time     Expected Discharge Date Expected Discharge Time    Apr 7, 2023          Demographic Summary     Row Name 04/07/23 1803       General Information    Admission Type observation    Arrived From emergency department    Referral Source admission list    Reason for Consult discharge planning    Preferred Language English               Functional  Status     Row Name 04/07/23 1804       Functional Status    Usual Activity Tolerance excellent    Current Activity Tolerance excellent       Functional Status, IADL    Medications independent    Meal Preparation independent    Housekeeping independent    Laundry independent    Shopping independent       Mental Status    General Appearance WDL WDL       Mental Status Summary    Recent Changes in Mental Status/Cognitive Functioning no changes               Met with patient in room.    Maintained distance greater than six feet and spent less than 15 minutes in the room.      Florence Morrow RN      Office Phone [phone]  Office Cell [phone]

## 2023-04-08 LAB
ALPHA1 GLOB MFR UR ELPH: 137 MG/DL (ref 90–200)
CERULOPLASMIN SERPL-MCNC: 18 MG/DL (ref 16–31)
GGT SERPL-CCNC: 886 U/L (ref 8–61)
MITOCHONDRIA M2 IGG SER-ACNC: <20 UNITS (ref 0–20)
SMA IGG SER-ACNC: 5 UNITS (ref 0–19)

## 2023-04-10 LAB — ANA SER QL: NEGATIVE

## 2023-04-14 LAB — HFE GENE MUT ANL BLD/T: NORMAL

## 2023-04-24 ENCOUNTER — OFFICE (OUTPATIENT)
Dept: URBAN - METROPOLITAN AREA CLINIC 64 | Facility: CLINIC | Age: 28
End: 2023-04-24

## 2023-04-24 VITALS
DIASTOLIC BLOOD PRESSURE: 91 MMHG | SYSTOLIC BLOOD PRESSURE: 144 MMHG | HEART RATE: 87 BPM | WEIGHT: 166 LBS | HEIGHT: 73 IN

## 2023-04-24 DIAGNOSIS — R11.2 NAUSEA WITH VOMITING, UNSPECIFIED: ICD-10-CM

## 2023-04-24 DIAGNOSIS — R74.8 ABNORMAL LEVELS OF OTHER SERUM ENZYMES: ICD-10-CM

## 2023-04-24 DIAGNOSIS — R10.12 LEFT UPPER QUADRANT PAIN: ICD-10-CM

## 2023-04-24 PROCEDURE — 99214 OFFICE O/P EST MOD 30 MIN: CPT

## 2023-08-10 ENCOUNTER — OFFICE (OUTPATIENT)
Dept: URBAN - METROPOLITAN AREA CLINIC 64 | Facility: CLINIC | Age: 28
End: 2023-08-10
Payer: MEDICAID

## 2023-08-10 VITALS
HEART RATE: 78 BPM | SYSTOLIC BLOOD PRESSURE: 125 MMHG | WEIGHT: 192 LBS | DIASTOLIC BLOOD PRESSURE: 78 MMHG | HEIGHT: 73 IN

## 2023-08-10 DIAGNOSIS — K58.9 IRRITABLE BOWEL SYNDROME WITHOUT DIARRHEA: ICD-10-CM

## 2023-08-10 DIAGNOSIS — Z14.8 GENETIC CARRIER OF OTHER DISEASE: ICD-10-CM

## 2023-08-10 DIAGNOSIS — R74.8 ABNORMAL LEVELS OF OTHER SERUM ENZYMES: ICD-10-CM

## 2023-08-10 PROCEDURE — 99214 OFFICE O/P EST MOD 30 MIN: CPT

## 2023-08-10 RX ORDER — DICYCLOMINE HYDROCHLORIDE 10 MG/1
40 CAPSULE ORAL
Qty: 90 | Refills: 11 | Status: ACTIVE
Start: 2023-08-10

## 2023-08-24 ENCOUNTER — HOSPITAL ENCOUNTER (OUTPATIENT)
Facility: HOSPITAL | Age: 28
Discharge: HOME | End: 2023-08-24
Attending: EMERGENCY MEDICINE | Admitting: EMERGENCY MEDICINE
Payer: MEDICAID

## 2023-08-24 ENCOUNTER — APPOINTMENT (OUTPATIENT)
Dept: GENERAL RADIOLOGY | Facility: HOSPITAL | Age: 28
End: 2023-08-24
Payer: MEDICAID

## 2023-08-24 VITALS
HEIGHT: 73 IN | SYSTOLIC BLOOD PRESSURE: 131 MMHG | DIASTOLIC BLOOD PRESSURE: 73 MMHG | TEMPERATURE: 97.6 F | RESPIRATION RATE: 17 BRPM | BODY MASS INDEX: 26.24 KG/M2 | WEIGHT: 198 LBS | HEART RATE: 87 BPM | OXYGEN SATURATION: 98 %

## 2023-08-24 DIAGNOSIS — S46.911A RIGHT SHOULDER STRAIN, INITIAL ENCOUNTER: Primary | ICD-10-CM

## 2023-08-24 PROCEDURE — G0463 HOSPITAL OUTPT CLINIC VISIT: HCPCS | Performed by: EMERGENCY MEDICINE

## 2023-08-24 PROCEDURE — 73030 X-RAY EXAM OF SHOULDER: CPT

## 2023-08-24 RX ORDER — DICLOFENAC SODIUM 75 MG/1
75 TABLET, DELAYED RELEASE ORAL 2 TIMES DAILY
Qty: 30 TABLET | Refills: 0 | Status: SHIPPED | OUTPATIENT
Start: 2023-08-24

## 2023-08-24 NOTE — ED NOTES
Pt c/o right shoulder pain x 1 months. Now with swelling in the right hand and some tingling to the fingertips. Also reports right arm turning purple at times

## 2023-08-24 NOTE — FSED PROVIDER NOTE
Subjective   History of Present Illness  Pt presents with mild/moderate r. Shoulder pain for weeks without injury or trauma, pt RHD, not currently working and in rehab for etoh abuse, no cp/soa, alleviated by rest, worsened by movement or elevation, nothing on the L. side    History provided by:  Patient   used: No      Review of Systems   Constitutional: Negative.    HENT: Negative.     Musculoskeletal:  Positive for arthralgias.   All other systems reviewed and are negative.    Past Medical History:   Diagnosis Date    Seizures     as a child and withdrawal related       No Known Allergies    Past Surgical History:   Procedure Laterality Date    MYRINGOTOMY W/ TUBES      PILONIDAL CYSTECTOMY         History reviewed. No pertinent family history.    Social History     Socioeconomic History    Marital status: Single   Tobacco Use    Smoking status: Every Day     Packs/day: 0.50     Types: Cigarettes    Smokeless tobacco: Never   Vaping Use    Vaping Use: Never used   Substance and Sexual Activity    Alcohol use: Yes     Alcohol/week: 16.0 standard drinks     Types: 16 Shots of liquor per week    Drug use: Yes     Types: Marijuana    Sexual activity: Defer           Objective   Physical Exam  Vitals and nursing note reviewed.   HENT:      Head: Normocephalic.      Mouth/Throat:      Mouth: Mucous membranes are moist.   Eyes:      Conjunctiva/sclera: Conjunctivae normal.   Cardiovascular:      Rate and Rhythm: Normal rate.   Pulmonary:      Effort: Pulmonary effort is normal.   Musculoskeletal:      Cervical back: Normal range of motion.      Comments: Diffuse non focal r. Shoulder pain with lrom due to pain, distally intact with good pulses, no elbow pain, no swelling   Skin:     General: Skin is warm.      Capillary Refill: Capillary refill takes less than 2 seconds.   Neurological:      General: No focal deficit present.      Mental Status: He is alert.   Psychiatric:         Mood and Affect:  Mood normal.       Procedures           ED Course                                           Medical Decision Making  - shoulder xr for fx/dislocation    Amount and/or Complexity of Data Reviewed  Radiology: ordered. Decision-making details documented in ED Course.        Final diagnoses:   Right shoulder strain, initial encounter       ED Disposition  ED Disposition       ED Disposition   Discharge    Condition   Stable    Comment   --               Angela Holden PA  [address] [facility] [place] [address] [phone]    In 3 days  If symptoms worsen         Medication List        New Prescriptions      diclofenac 75 MG EC tablet  Commonly known as: VOLTAREN  Take 1 tablet by mouth 2 (Two) Times a Day.               Where to Get Your Medications        These medications were sent to [facility] - [place], [place] - [address] - [phone]  - [phone] FX  [address] [place] [address]      Phone: [phone]   diclofenac 75 MG EC tablet

## 2023-12-13 ENCOUNTER — OFFICE (OUTPATIENT)
Dept: URBAN - METROPOLITAN AREA CLINIC 64 | Facility: CLINIC | Age: 28
End: 2023-12-13
Payer: MEDICAID

## 2023-12-13 VITALS
DIASTOLIC BLOOD PRESSURE: 86 MMHG | HEIGHT: 73 IN | HEART RATE: 111 BPM | WEIGHT: 205.2 LBS | SYSTOLIC BLOOD PRESSURE: 133 MMHG

## 2023-12-13 DIAGNOSIS — R74.8 ABNORMAL LEVELS OF OTHER SERUM ENZYMES: ICD-10-CM

## 2023-12-13 DIAGNOSIS — K58.0 IRRITABLE BOWEL SYNDROME WITH DIARRHEA: ICD-10-CM

## 2023-12-13 DIAGNOSIS — Z14.8 GENETIC CARRIER OF OTHER DISEASE: ICD-10-CM

## 2023-12-13 DIAGNOSIS — R15.2 FECAL URGENCY: ICD-10-CM

## 2023-12-13 PROCEDURE — 99214 OFFICE O/P EST MOD 30 MIN: CPT

## 2023-12-13 RX ORDER — DICYCLOMINE HYDROCHLORIDE 20 MG/1
80 TABLET ORAL
Qty: 60 | Refills: 6 | Status: ACTIVE
Start: 2023-12-13